# Patient Record
Sex: FEMALE | Race: WHITE
[De-identification: names, ages, dates, MRNs, and addresses within clinical notes are randomized per-mention and may not be internally consistent; named-entity substitution may affect disease eponyms.]

---

## 2019-01-23 ENCOUNTER — RX ONLY (RX ONLY)
Age: 59
End: 2019-01-23

## 2019-01-23 ENCOUNTER — DOCTOR'S OFFICE (OUTPATIENT)
Dept: URBAN - NONMETROPOLITAN AREA CLINIC 1 | Facility: CLINIC | Age: 59
Setting detail: OPHTHALMOLOGY
End: 2019-01-23
Payer: COMMERCIAL

## 2019-01-23 DIAGNOSIS — E03.4: ICD-10-CM

## 2019-01-23 DIAGNOSIS — H25.013: ICD-10-CM

## 2019-01-23 PROCEDURE — 99204 OFFICE O/P NEW MOD 45 MIN: CPT | Performed by: OPHTHALMOLOGY

## 2019-01-23 ASSESSMENT — REFRACTION_MANIFEST
OS_VA1: 20/25+1
OD_VA1: 20/25+1
OS_VA3: 20/
OS_VA1: 20/
OS_CYLINDER: -0.50
OS_VA3: 20/
OS_SPHERE: -0.25
OU_VA: 20/
OS_VA2: 20/25+1
OD_VA3: 20/
OU_VA: 20/
OS_ADD: +2.50
OD_SPHERE: -1.00
OD_VA1: 20/
OD_VA3: 20/
OD_CYLINDER: -0.25
OD_AXIS: 120
OS_AXIS: 120
OD_VA2: 20/25+1
OD_ADD: +2.50
OD_VA2: 20/
OS_VA2: 20/

## 2019-01-23 ASSESSMENT — REFRACTION_CURRENTRX
OD_SPHERE: -1.25
OD_CYLINDER: -0.25
OD_VPRISM_DIRECTION: BF
OS_OVR_VA: 20/
OS_VPRISM_DIRECTION: BF
OS_CYLINDER: -1.00
OD_AXIS: 005
OD_OVR_VA: 20/
OD_ADD: +2.75
OS_OVR_VA: 20/
OS_ADD: +2.75
OS_AXIS: 087
OS_SPHERE: -0.50
OS_OVR_VA: 20/

## 2019-01-23 ASSESSMENT — SPHEQUIV_DERIVED
OS_SPHEQUIV: -0.5
OS_SPHEQUIV: -1.625
OD_SPHEQUIV: -1.125
OD_SPHEQUIV: -1.125

## 2019-01-23 ASSESSMENT — REFRACTION_AUTOREFRACTION
OD_SPHERE: -1.00
OS_AXIS: 129
OD_AXIS: 130
OD_CYLINDER: -0.25
OS_SPHERE: -0.50
OS_CYLINDER: -2.25

## 2019-01-23 ASSESSMENT — CONFRONTATIONAL VISUAL FIELD TEST (CVF)
OD_FINDINGS: FULL
OS_FINDINGS: FULL

## 2019-01-23 ASSESSMENT — VISUAL ACUITY
OS_BCVA: 20/60
OD_BCVA: 20/50

## 2019-07-24 ENCOUNTER — DOCTOR'S OFFICE (OUTPATIENT)
Dept: URBAN - NONMETROPOLITAN AREA CLINIC 1 | Facility: CLINIC | Age: 59
Setting detail: OPHTHALMOLOGY
End: 2019-07-24
Payer: COMMERCIAL

## 2019-07-24 DIAGNOSIS — E03.4: ICD-10-CM

## 2019-07-24 DIAGNOSIS — I63.50: ICD-10-CM

## 2019-07-24 DIAGNOSIS — H25.013: ICD-10-CM

## 2019-07-24 PROCEDURE — 92014 COMPRE OPH EXAM EST PT 1/>: CPT | Performed by: OPHTHALMOLOGY

## 2019-07-24 PROCEDURE — 92083 EXTENDED VISUAL FIELD XM: CPT | Performed by: OPHTHALMOLOGY

## 2019-07-24 ASSESSMENT — REFRACTION_MANIFEST
OS_VA3: 20/
OS_VA2: 20/
OU_VA: 20/
OD_SPHERE: -1.00
OS_VA3: 20/
OS_VA1: 20/25+1
OS_VA2: 20/25+1
OD_VA1: 20/
OU_VA: 20/
OD_VA3: 20/
OD_VA1: 20/25+1
OD_AXIS: 120
OD_CYLINDER: -0.25
OS_VA1: 20/
OS_AXIS: 120
OD_VA2: 20/25+1
OS_SPHERE: -0.25
OD_VA2: 20/
OD_ADD: +2.50
OD_VA3: 20/
OS_CYLINDER: -0.50
OS_ADD: +2.50

## 2019-07-24 ASSESSMENT — SPHEQUIV_DERIVED
OD_SPHEQUIV: -1.125
OS_SPHEQUIV: -1
OD_SPHEQUIV: -1.625
OS_SPHEQUIV: -0.5

## 2019-07-24 ASSESSMENT — REFRACTION_CURRENTRX
OS_AXIS: 086
OS_VPRISM_DIRECTION: BF
OS_OVR_VA: 20/
OS_ADD: +2.50
OS_OVR_VA: 20/
OD_OVR_VA: 20/
OD_ADD: +2.50
OS_CYLINDER: -0.75
OD_VPRISM_DIRECTION: BF
OS_OVR_VA: 20/
OS_SPHERE: -0.25
OD_SPHERE: -1.00
OD_AXIS: 180
OD_OVR_VA: 20/
OD_CYLINDER: 0.00
OD_OVR_VA: 20/

## 2019-07-24 ASSESSMENT — CONFRONTATIONAL VISUAL FIELD TEST (CVF)
OD_FINDINGS: FULL
OS_FINDINGS: FULL

## 2019-07-24 ASSESSMENT — VISUAL ACUITY
OD_BCVA: 20/60+2
OS_BCVA: 20/60

## 2019-07-24 ASSESSMENT — KERATOMETRY
OD_AXISANGLE_DEGREES: 106
OD_K1POWER_DIOPTERS: 44.00
OD_K2POWER_DIOPTERS: 44.50
METHOD_AUTO_MANUAL: AUTO

## 2019-07-24 ASSESSMENT — REFRACTION_AUTOREFRACTION
OD_SPHERE: -1.25
OS_SPHERE: -1.00
OS_CYLINDER: 0.00
OD_CYLINDER: -0.75
OD_AXIS: 118

## 2019-07-24 ASSESSMENT — AXIALLENGTH_DERIVED
OD_AL: 23.7559
OD_AL: 23.955

## 2019-07-31 ENCOUNTER — OPTICAL OFFICE (OUTPATIENT)
Dept: URBAN - NONMETROPOLITAN AREA CLINIC 4 | Facility: CLINIC | Age: 59
Setting detail: OPHTHALMOLOGY
End: 2019-07-31
Payer: COMMERCIAL

## 2019-07-31 ENCOUNTER — DOCTOR'S OFFICE (OUTPATIENT)
Dept: URBAN - NONMETROPOLITAN AREA CLINIC 1 | Facility: CLINIC | Age: 59
Setting detail: OPHTHALMOLOGY
End: 2019-07-31
Payer: COMMERCIAL

## 2019-07-31 DIAGNOSIS — H52.4: ICD-10-CM

## 2019-07-31 DIAGNOSIS — H52.223: ICD-10-CM

## 2019-07-31 PROCEDURE — V2203 LENS SPHCYL BIFOCAL 4.00D/.1: HCPCS | Performed by: OPTOMETRIST

## 2019-07-31 PROCEDURE — 92015 DETERMINE REFRACTIVE STATE: CPT | Performed by: OPTOMETRIST

## 2019-07-31 PROCEDURE — V2020 VISION SVCS FRAMES PURCHASES: HCPCS | Performed by: OPTOMETRIST

## 2019-07-31 ASSESSMENT — VISUAL ACUITY
OS_BCVA: 20/80-1
OD_BCVA: 20/70

## 2019-07-31 ASSESSMENT — REFRACTION_CURRENTRX
OD_CYLINDER: 0.00
OS_OVR_VA: 20/
OS_VPRISM_DIRECTION: BF
OD_VPRISM_DIRECTION: BF
OD_OVR_VA: 20/
OS_OVR_VA: 20/
OD_OVR_VA: 20/
OS_ADD: +2.50
OS_CYLINDER: -0.75
OD_SPHERE: -1.00-
OD_ADD: +2.50
OS_OVR_VA: 20/
OS_SPHERE: -0.25
OS_AXIS: 087
OD_OVR_VA: 20/
OD_AXIS: 180

## 2019-07-31 ASSESSMENT — REFRACTION_MANIFEST
OD_VA1: 20/25-
OD_VA2: 20/
OD_CYLINDER: -0.75
OS_SPHERE: -1.00
OD_AXIS: 120
OD_ADD: +2.50
OS_CYLINDER: -0.50
OU_VA: 20/
OD_VA1: 20/
OD_SPHERE: -1.25
OU_VA: 20/20-2
OS_VA1: 20/
OD_VA3: 20/
OS_ADD: +2.50
OS_AXIS: 120
OD_VA3: 20/
OS_VA3: 20/
OS_VA2: 20/25+1
OS_VA3: 20/
OS_VA2: 20/
OD_VA2: 20/25+1
OS_VA1: 20/20-

## 2019-07-31 ASSESSMENT — REFRACTION_AUTOREFRACTION
OS_SPHERE: -1.00
OD_AXIS: 075
OS_AXIS: 152
OD_CYLINDER: -2.25
OS_CYLINDER: -0.25
OD_SPHERE: -1.00

## 2019-07-31 ASSESSMENT — SPHEQUIV_DERIVED
OS_SPHEQUIV: -1.25
OD_SPHEQUIV: -1.625
OD_SPHEQUIV: -2.125
OS_SPHEQUIV: -1.125

## 2019-07-31 ASSESSMENT — AXIALLENGTH_DERIVED
OD_AL: 23.955
OD_AL: 24.1576

## 2019-08-28 ENCOUNTER — DOCTOR'S OFFICE (OUTPATIENT)
Dept: URBAN - NONMETROPOLITAN AREA CLINIC 1 | Facility: CLINIC | Age: 59
Setting detail: OPHTHALMOLOGY
End: 2019-08-28
Payer: COMMERCIAL

## 2019-08-28 ENCOUNTER — RX ONLY (RX ONLY)
Age: 59
End: 2019-08-28

## 2019-08-28 DIAGNOSIS — H47.013: ICD-10-CM

## 2019-08-28 DIAGNOSIS — I63.50: ICD-10-CM

## 2019-08-28 DIAGNOSIS — E03.4: ICD-10-CM

## 2019-08-28 DIAGNOSIS — H25.013: ICD-10-CM

## 2019-08-28 PROCEDURE — 92133 CPTRZD OPH DX IMG PST SGM ON: CPT | Performed by: OPHTHALMOLOGY

## 2019-08-28 PROCEDURE — 92014 COMPRE OPH EXAM EST PT 1/>: CPT | Performed by: OPHTHALMOLOGY

## 2019-08-28 ASSESSMENT — REFRACTION_MANIFEST
OS_VA2: 20/
OD_AXIS: 120
OD_VA1: 20/25-
OS_AXIS: 120
OS_VA1: 20/20-
OS_CYLINDER: -0.50
OD_VA1: 20/
OS_VA3: 20/
OD_CYLINDER: -0.75
OU_VA: 20/
OD_VA2: 20/
OS_VA3: 20/
OD_VA3: 20/
OS_VA2: 20/25+1
OS_SPHERE: -1.00
OD_SPHERE: -1.25
OS_ADD: +2.50
OD_VA3: 20/
OD_VA2: 20/25+1
OD_ADD: +2.50
OU_VA: 20/20-2
OS_VA1: 20/

## 2019-08-28 ASSESSMENT — REFRACTION_CURRENTRX
OS_OVR_VA: 20/
OD_ADD: +2.75
OS_ADD: +3.00
OS_CYLINDER: -0.50
OS_VPRISM_DIRECTION: BF
OD_OVR_VA: 20/
OD_AXIS: 128
OS_SPHERE: -1.25
OD_VPRISM_DIRECTION: BF
OS_OVR_VA: 20/
OD_OVR_VA: 20/
OD_OVR_VA: 20/
OS_AXIS: 126
OS_OVR_VA: 20/
OD_CYLINDER: -0.75
OD_SPHERE: -1.50

## 2019-08-28 ASSESSMENT — VISUAL ACUITY
OD_BCVA: 20/20-1
OS_BCVA: 20/25

## 2019-08-28 ASSESSMENT — REFRACTION_AUTOREFRACTION
OS_AXIS: 152
OS_SPHERE: -1.00
OD_SPHERE: -1.00
OS_CYLINDER: -0.25
OD_AXIS: 075
OD_CYLINDER: -2.25

## 2019-08-28 ASSESSMENT — SPHEQUIV_DERIVED
OS_SPHEQUIV: -1.25
OS_SPHEQUIV: -1.125
OD_SPHEQUIV: -2.125
OD_SPHEQUIV: -1.625

## 2019-08-28 ASSESSMENT — CONFRONTATIONAL VISUAL FIELD TEST (CVF)
OD_FINDINGS: FULL
OS_FINDINGS: FULL

## 2020-03-18 ENCOUNTER — DOCTOR'S OFFICE (OUTPATIENT)
Dept: URBAN - NONMETROPOLITAN AREA CLINIC 1 | Facility: CLINIC | Age: 60
Setting detail: OPHTHALMOLOGY
End: 2020-03-18
Payer: COMMERCIAL

## 2020-03-18 ENCOUNTER — RX ONLY (RX ONLY)
Age: 60
End: 2020-03-18

## 2020-03-18 DIAGNOSIS — H47.013: ICD-10-CM

## 2020-03-18 DIAGNOSIS — H25.13: ICD-10-CM

## 2020-03-18 DIAGNOSIS — I63.50: ICD-10-CM

## 2020-03-18 DIAGNOSIS — H25.013: ICD-10-CM

## 2020-03-18 PROBLEM — H40.1111: Status: ACTIVE | Noted: 2020-03-18

## 2020-03-18 PROBLEM — H52.4 MYOPIA ASTIGMATISM NOS PRESBYOPIA ; BOTH EYES: Status: ACTIVE | Noted: 2019-01-23

## 2020-03-18 PROBLEM — E03.4: Status: ACTIVE | Noted: 2019-01-23

## 2020-03-18 PROCEDURE — 92083 EXTENDED VISUAL FIELD XM: CPT | Performed by: OPHTHALMOLOGY

## 2020-03-18 PROCEDURE — 92014 COMPRE OPH EXAM EST PT 1/>: CPT | Performed by: OPHTHALMOLOGY

## 2020-03-18 ASSESSMENT — KERATOMETRY
METHOD_AUTO_MANUAL: AUTO
OD_AXISANGLE_DEGREES: 106
OD_K2POWER_DIOPTERS: 44.50
OD_K1POWER_DIOPTERS: 44.00

## 2020-03-18 ASSESSMENT — REFRACTION_AUTOREFRACTION
OD_AXIS: 052
OD_CYLINDER: -0.25
OS_SPHERE: ERROR OS
OD_SPHERE: -1.25

## 2020-03-18 ASSESSMENT — REFRACTION_CURRENTRX
OD_SPHERE: -1.50
OS_ADD: +3.00
OD_OVR_VA: 20/
OD_ADD: +3.00
OS_OVR_VA: 20/
OS_AXIS: 126
OD_AXIS: 128
OD_CYLINDER: -0.75
OS_CYLINDER: -0.50
OS_SPHERE: -1.25
OS_VPRISM_DIRECTION: BF
OD_VPRISM_DIRECTION: BF

## 2020-03-18 ASSESSMENT — VISUAL ACUITY
OD_BCVA: 20/25-1
OS_BCVA: 20/40

## 2020-03-18 ASSESSMENT — REFRACTION_MANIFEST
OS_CYLINDER: -0.50
OS_VA1: 20/20-
OD_VA2: 20/25+1
OD_CYLINDER: -0.75
OD_VA1: 20/25-
OS_AXIS: 120
OS_VA2: 20/25+1
OD_SPHERE: -1.25
OD_ADD: +2.50
OS_ADD: +2.50
OS_SPHERE: -1.00
OD_AXIS: 120
OU_VA: 20/20-2

## 2020-03-18 ASSESSMENT — SPHEQUIV_DERIVED
OD_SPHEQUIV: -1.375
OD_SPHEQUIV: -1.625
OS_SPHEQUIV: -1.25

## 2020-03-18 ASSESSMENT — AXIALLENGTH_DERIVED
OD_AL: 23.955
OD_AL: 23.855

## 2020-03-18 ASSESSMENT — CONFRONTATIONAL VISUAL FIELD TEST (CVF)
OS_FINDINGS: FULL
OD_FINDINGS: FULL

## 2023-12-20 ENCOUNTER — DOCTOR'S OFFICE (OUTPATIENT)
Dept: URBAN - NONMETROPOLITAN AREA CLINIC 1 | Facility: CLINIC | Age: 63
Setting detail: OPHTHALMOLOGY
End: 2023-12-20
Payer: COMMERCIAL

## 2023-12-20 DIAGNOSIS — H40.1132: ICD-10-CM

## 2023-12-20 DIAGNOSIS — I63.50: ICD-10-CM

## 2023-12-20 DIAGNOSIS — H25.13: ICD-10-CM

## 2023-12-20 DIAGNOSIS — H25.013: ICD-10-CM

## 2023-12-20 PROCEDURE — 76514 ECHO EXAM OF EYE THICKNESS: CPT | Performed by: OPHTHALMOLOGY

## 2023-12-20 PROCEDURE — 92014 COMPRE OPH EXAM EST PT 1/>: CPT | Performed by: OPHTHALMOLOGY

## 2023-12-20 PROCEDURE — 92133 CPTRZD OPH DX IMG PST SGM ON: CPT | Performed by: OPHTHALMOLOGY

## 2023-12-20 ASSESSMENT — CONFRONTATIONAL VISUAL FIELD TEST (CVF)
OS_FINDINGS: FULL
OD_FINDINGS: FULL

## 2023-12-21 ASSESSMENT — REFRACTION_CURRENTRX
OS_ADD: +3.00
OS_CYLINDER: -0.50
OD_OVR_VA: 20/
OS_AXIS: 126
OD_CYLINDER: -0.75
OD_SPHERE: -1.50
OD_AXIS: 128
OS_SPHERE: -1.25
OD_VPRISM_DIRECTION: BF
OS_OVR_VA: 20/
OD_ADD: +3.00
OS_VPRISM_DIRECTION: BF

## 2023-12-21 ASSESSMENT — SPHEQUIV_DERIVED
OD_SPHEQUIV: -1.625
OS_SPHEQUIV: -1.25
OS_SPHEQUIV: -2.25
OD_SPHEQUIV: -1.375

## 2023-12-21 ASSESSMENT — REFRACTION_MANIFEST
OS_VA2: 20/25+1
OD_SPHERE: -1.25
OS_VA1: 20/20-
OD_VA2: 20/25+1
OU_VA: 20/20-2
OS_ADD: +2.50
OD_AXIS: 120
OD_CYLINDER: -0.75
OD_ADD: +2.50
OD_VA1: 20/25-
OS_AXIS: 120
OS_CYLINDER: -0.50
OS_SPHERE: -1.00

## 2023-12-21 ASSESSMENT — REFRACTION_AUTOREFRACTION
OS_CYLINDER: -1.00
OS_AXIS: 122
OD_SPHERE: -1.25
OD_CYLINDER: -0.25
OD_AXIS: 052
OS_SPHERE: -1.75

## 2024-03-08 ENCOUNTER — DOCTOR'S OFFICE (OUTPATIENT)
Dept: URBAN - NONMETROPOLITAN AREA CLINIC 1 | Facility: CLINIC | Age: 64
Setting detail: OPHTHALMOLOGY
End: 2024-03-08
Payer: MEDICARE

## 2024-03-08 ENCOUNTER — RX ONLY (RX ONLY)
Age: 64
End: 2024-03-08

## 2024-03-08 DIAGNOSIS — I63.50: ICD-10-CM

## 2024-03-08 DIAGNOSIS — H40.1132: ICD-10-CM

## 2024-03-08 DIAGNOSIS — H25.013: ICD-10-CM

## 2024-03-08 DIAGNOSIS — H25.13: ICD-10-CM

## 2024-03-08 PROCEDURE — 99213 OFFICE O/P EST LOW 20 MIN: CPT | Performed by: OPHTHALMOLOGY

## 2024-03-08 PROCEDURE — 92083 EXTENDED VISUAL FIELD XM: CPT | Performed by: OPHTHALMOLOGY

## 2024-03-14 ENCOUNTER — DOCTOR'S OFFICE (OUTPATIENT)
Dept: URBAN - NONMETROPOLITAN AREA CLINIC 1 | Facility: CLINIC | Age: 64
Setting detail: OPHTHALMOLOGY
End: 2024-03-14
Payer: COMMERCIAL

## 2024-03-14 DIAGNOSIS — H52.13: ICD-10-CM

## 2024-03-14 DIAGNOSIS — H52.4: ICD-10-CM

## 2024-03-14 DIAGNOSIS — H52.223: ICD-10-CM

## 2024-03-14 PROCEDURE — 92015 DETERMINE REFRACTIVE STATE: CPT

## 2024-03-14 PROCEDURE — S0621 ROUTINE OPHTHALMOLOGICAL EXA: HCPCS

## 2024-03-14 PROCEDURE — 92014 COMPRE OPH EXAM EST PT 1/>: CPT

## 2024-03-26 ENCOUNTER — TELEPHONE (OUTPATIENT)
Dept: ADMINISTRATIVE | Facility: OTHER | Age: 64
End: 2024-03-26

## 2024-03-26 ENCOUNTER — OFFICE VISIT (OUTPATIENT)
Dept: FAMILY MEDICINE CLINIC | Facility: CLINIC | Age: 64
End: 2024-03-26
Payer: COMMERCIAL

## 2024-03-26 VITALS
TEMPERATURE: 97.8 F | OXYGEN SATURATION: 96 % | HEIGHT: 66 IN | HEART RATE: 70 BPM | WEIGHT: 293 LBS | DIASTOLIC BLOOD PRESSURE: 80 MMHG | BODY MASS INDEX: 47.09 KG/M2 | SYSTOLIC BLOOD PRESSURE: 126 MMHG

## 2024-03-26 DIAGNOSIS — I10 PRIMARY HYPERTENSION: ICD-10-CM

## 2024-03-26 DIAGNOSIS — K21.9 GASTROESOPHAGEAL REFLUX DISEASE WITHOUT ESOPHAGITIS: ICD-10-CM

## 2024-03-26 DIAGNOSIS — K50.919 CROHN'S DISEASE WITH COMPLICATION, UNSPECIFIED GASTROINTESTINAL TRACT LOCATION (HCC): ICD-10-CM

## 2024-03-26 DIAGNOSIS — I69.30 HISTORY OF CVA WITH RESIDUAL DEFICIT: ICD-10-CM

## 2024-03-26 DIAGNOSIS — N18.32 STAGE 3B CHRONIC KIDNEY DISEASE (HCC): ICD-10-CM

## 2024-03-26 DIAGNOSIS — E78.2 MIXED HYPERLIPIDEMIA: Primary | ICD-10-CM

## 2024-03-26 DIAGNOSIS — R26.2 AMBULATORY DYSFUNCTION: ICD-10-CM

## 2024-03-26 DIAGNOSIS — N39.46 MIXED STRESS AND URGE URINARY INCONTINENCE: ICD-10-CM

## 2024-03-26 DIAGNOSIS — I25.10 CORONARY ARTERY DISEASE INVOLVING NATIVE CORONARY ARTERY OF NATIVE HEART WITHOUT ANGINA PECTORIS: ICD-10-CM

## 2024-03-26 DIAGNOSIS — E66.01 CLASS 3 SEVERE OBESITY DUE TO EXCESS CALORIES WITH SERIOUS COMORBIDITY AND BODY MASS INDEX (BMI) OF 50.0 TO 59.9 IN ADULT (HCC): ICD-10-CM

## 2024-03-26 DIAGNOSIS — Z59.9 FINANCIAL DIFFICULTIES: ICD-10-CM

## 2024-03-26 DIAGNOSIS — F01.B3 MODERATE VASCULAR DEMENTIA WITH MOOD DISTURBANCE (HCC): ICD-10-CM

## 2024-03-26 DIAGNOSIS — E03.9 ACQUIRED HYPOTHYROIDISM: ICD-10-CM

## 2024-03-26 PROBLEM — E78.5 HYPERLIPIDEMIA: Status: ACTIVE | Noted: 2018-11-07

## 2024-03-26 PROBLEM — F41.1 GAD (GENERALIZED ANXIETY DISORDER): Status: ACTIVE | Noted: 2019-05-23

## 2024-03-26 PROBLEM — I51.81 TAKOTSUBO CARDIOMYOPATHY: Status: ACTIVE | Noted: 2021-06-14

## 2024-03-26 PROBLEM — J30.2 SEASONAL ALLERGIES: Status: ACTIVE | Noted: 2019-05-23

## 2024-03-26 PROBLEM — G89.29 CHRONIC BACK PAIN: Status: ACTIVE | Noted: 2020-10-16

## 2024-03-26 PROBLEM — F33.41 RECURRENT MAJOR DEPRESSIVE DISORDER, IN PARTIAL REMISSION (HCC): Chronic | Status: ACTIVE | Noted: 2019-05-23

## 2024-03-26 PROBLEM — M54.9 CHRONIC BACK PAIN: Status: ACTIVE | Noted: 2020-10-16

## 2024-03-26 PROBLEM — F02.80 LATE ONSET ALZHEIMER'S DISEASE WITHOUT BEHAVIORAL DISTURBANCE (HCC): Status: ACTIVE | Noted: 2019-05-23

## 2024-03-26 PROBLEM — R94.31 PROLONGED Q-T INTERVAL ON ECG: Status: ACTIVE | Noted: 2021-06-14

## 2024-03-26 PROBLEM — K50.90 CROHN'S DISEASE (HCC): Status: ACTIVE | Noted: 2021-06-14

## 2024-03-26 PROBLEM — R00.1 SYMPTOMATIC BRADYCARDIA: Status: ACTIVE | Noted: 2021-06-14

## 2024-03-26 PROBLEM — R00.1 SYMPTOMATIC BRADYCARDIA: Status: RESOLVED | Noted: 2021-06-14 | Resolved: 2024-03-26

## 2024-03-26 PROBLEM — G30.1 LATE ONSET ALZHEIMER'S DISEASE WITHOUT BEHAVIORAL DISTURBANCE (HCC): Status: ACTIVE | Noted: 2019-05-23

## 2024-03-26 PROCEDURE — G2211 COMPLEX E/M VISIT ADD ON: HCPCS | Performed by: FAMILY MEDICINE

## 2024-03-26 PROCEDURE — 99205 OFFICE O/P NEW HI 60 MIN: CPT | Performed by: FAMILY MEDICINE

## 2024-03-26 RX ORDER — ATORVASTATIN CALCIUM 10 MG/1
10 TABLET, FILM COATED ORAL DAILY
COMMUNITY

## 2024-03-26 RX ORDER — MULTIVITAMIN/IRON/FOLIC ACID 18MG-0.4MG
TABLET ORAL
COMMUNITY

## 2024-03-26 RX ORDER — ACETAMINOPHEN 325 MG/1
650 TABLET ORAL EVERY 4 HOURS PRN
COMMUNITY

## 2024-03-26 RX ORDER — MAGNESIUM HYDROXIDE/ALUMINUM HYDROXICE/SIMETHICONE 120; 1200; 1200 MG/30ML; MG/30ML; MG/30ML
30 SUSPENSION ORAL EVERY 4 HOURS PRN
COMMUNITY

## 2024-03-26 RX ORDER — PANTOPRAZOLE SODIUM 20 MG/1
20 TABLET, DELAYED RELEASE ORAL DAILY
COMMUNITY

## 2024-03-26 RX ORDER — GUAIFENESIN 200 MG/10ML
200 LIQUID ORAL
COMMUNITY

## 2024-03-26 RX ORDER — LEVOTHYROXINE SODIUM 0.03 MG/1
25 TABLET ORAL DAILY
COMMUNITY
End: 2024-03-27 | Stop reason: SDUPTHER

## 2024-03-26 RX ORDER — FLUOXETINE HYDROCHLORIDE 40 MG/1
40 CAPSULE ORAL DAILY
COMMUNITY

## 2024-03-26 RX ORDER — ASPIRIN 81 MG/1
81 TABLET, CHEWABLE ORAL DAILY
COMMUNITY

## 2024-03-26 SDOH — ECONOMIC STABILITY - INCOME SECURITY: PROBLEM RELATED TO HOUSING AND ECONOMIC CIRCUMSTANCES, UNSPECIFIED: Z59.9

## 2024-03-26 NOTE — PROGRESS NOTES
Name: Ginna Landreos      : 1960      MRN: [id number]  Encounter Provider: Mallika Smiley DO  Encounter Date: 3/26/2024   Encounter department: [facility]    Assessment & Plan     1. Mixed hyperlipidemia  Assessment & Plan:  No labs since   Was previously on statin and Zetia, but not currently taking these medications  Will plan for labs at follow-up visit and likely restarting her medications given her history.      2. Ambulatory dysfunction  Assessment & Plan:  CVA in  with residual left sided weakness. Patient currently ambulating with a walker.  Her goal would be to return to walking with a cane and overall improve her ambulatory status.  She never completed any aggressive therapy following her stroke and is interested in this at this time.  I am going to refer to outpatient PT, pending evaluation, patient may benefit from in-home therapy.  Will continue to monitor    Orders:  -     Ambulatory Referral to Physical Therapy; Future    3. History of CVA with residual deficit  Assessment & Plan:  ?  Does not appear patient is on any medications for this at this time.  She does appear to have been on aspirin and Lipitor in the past.  Per pharmacy she has not filled any of these prescriptions within the last year.  Will obtain labs at her follow-up visit with me and plan on restarting her on her medications.     Orders:  -     Ambulatory Referral to Physical Therapy; Future    4. Mixed stress and urge urinary incontinence  Assessment & Plan:  Recommend PT and regular kegal exercises. Continue to monitor.     Orders:  -     Ambulatory Referral to Physical Therapy; Future    5. Crohn's disease with complication, unspecified gastrointestinal tract location (HCC)  Assessment & Plan:  Patient reports a history of Crohn's disease, not currently on any medications.  She denies any acute symptoms at this time.  Will continue to monitor      6. Stage 3b chronic kidney disease  (HCC)  Assessment & Plan:  Lab Results   Component Value Date    EGFR 88 12/13/2023    EGFR 96 11/15/2023    EGFR 72 09/08/2023    CREATININE 0.75 12/13/2023    CREATININE 0.69 11/15/2023    CREATININE 0.89 09/08/2023     Recently labs with stage 2 CKD, will update labs at follow up and continue to monitor.       7. 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)  Assessment & Plan:  Was previously documented as alzheimer's dementia, however given her age and history of CVA, MI, I believe this is vascular dementia. There is not testing done to confirm dementia type. Consider MRI imaging and MMSE testing at follow up. Patient was on aricept in the past but does not currently take this medication      8. Acquired hypothyroidism  Assessment & Plan:  Currently uncontrolled at this time had TSH greater than 100 last week from labs completed by her prior PCP  Patient currently admits to taking levothyroxine 200 mcg daily and has a refill that she is supposed to be picking up.  Per pharmacy, patient has not filled this prescription since September 2023. I will send a refill, and follow up with patient in 2-4 weeks    Orders:  -     levothyroxine 200 mcg tablet; Take 1 tablet (200 mcg total) by mouth daily    9. Gastroesophageal reflux disease without esophagitis  Assessment & Plan:  Was previously on Protonix, do not believe patient is taking this based on pharmacy provided information.  Will continue to monitor symptoms and consider resuming medication at follow-up if needed      10. Coronary artery disease involving native coronary artery of native heart without angina pectoris  Assessment & Plan:  History of MI per patient, without stent placement? (History unclear)  Cardiology saw patient in hospital 6/2021 - patient with NSTEMI/Takotsubo cardiomyopathy thought to be 2/2 uncontrolled hypothyroidism. Echo at the time EF 30-35%. Patient discharged when thyroid improved and was to f/u with cardiology and have  follow up echo but never did.   Does not currently follow with cardiology. Patient not sure of medications. Per pharmacy patient uses, she is not currently taking any of the previously prescribed medications: asa, atorvastatin  Will attempt to obtain records from prior PCP  Plan for CBC, lipid panel, A1C, CMP, echo at follow up and restart patient on statin, asa. Would additionally recommend BB, ACEi      11. Primary hypertension  Assessment & Plan:  I do not believe patient is currently on any medications for this. Current Blood Pressure: 126/80  Possibly low in setting of hypothyroidism. Will need to continue to monitor as we treat her thyroid disease.       12. Class 3 severe obesity due to excess calories with serious comorbidity and body mass index (BMI) of 50.0 to 59.9 in adult (HCC)    13. Financial difficulties  -     Ambulatory Referral to Social Work Care Management Program; Future        Depression Screening and Follow-up Plan: Patient's depression screening was positive with a PHQ-9 score of 12. Depression likely due to other medical condition. Will treat underlying condition. Suspect this is in the setting of vascular dementia. Patient also with document history of depression and anxiety. We briefly discussed this. It does not appear patient is taking the prozac as pharmacy has not filled this for her in some time. We will confirm medical records and follow up closely in 2-4 weeks to determine medication plan for her.     Urinary Incontinence Plan of Care: counseling topics discussed: practice Kegel (pelvic floor strengthening) exercises and use restroom every 2 hours. Referral was placed for Physical therapy.     Tobacco Cessation Counseling: Tobacco cessation counseling was provided. The patient is sincerely urged to quit consumption of tobacco. She is not ready to quit tobacco.         I have spent a total time of 65 minutes on 03/26/2024 in caring for this patient including Patient and family  education, Counseling / Coordination of care, Documenting in the medical record, Reviewing / ordering tests, medicine, procedures  , and Obtaining or reviewing history  .      Subjective      HPI  Patient presents to [facility]. She reports she used to go to Dr. Peace for primary care. She lives in [place].     Patient unable to provide me with reliable PMH. [facility] and other available sources to gather data on this patient. We have not yet received any medical records from her prior PCP.     PMH: Htn, HLD, CAD, GERD, hypothyroidism, CKD3, history of CVA, History of MI, anxiety, and depression, Crohn's Disease and not currently following with anyone for this,   SurgHx: Back surgery, pilonidal cyst surgery, Cholecystectomy, dental surgery   Allergies: apple, strawberry, ciprofloxacin, penicillins   Medications: Patient forgot her med list and is unsure what she is taking. What is currently listed was pulled from other medical records.   SocialHx:   Tobacco: current smoker - 1ppd for >40 years    Alcohol: <3-4 beers per week on average    Relationship: single   Career: , [employer], [employer], [employer] (now retired and on medicare)     She had labs for thyroid last week which showed significantly elevated TSH at 137.14 and T4 at 0.31  She is currently taking her thyroid pill, stating she is taking 200mcg daily, they just told her to increase it but she never got this from the drug store.     She is still feeling very fatigued and tired. Denies heat or cold intolerance. She denies constipation. She admits to depression.     She uses [place] [facility] for her medications.     Overall patient is a poor historian. She has a history of a stroke and documented history of Alzheimer dementia, though I suspect component of vascular dementia given her history of stroke and MI. She is unable to confirm any medical history of medications for me today.     Staff called  [facility] and only medications the patient has filled there are levothyroxine 200mcg daily, last filled in September, and Latanoprost eye Drops. Patient denied other pharmacy use.     Since her stroke the patient reports she has needed a walker for ambulation. She would like to be more mobile and get back to using just a cane for ambulation but she has not worked with physical therapy in some time. She states she was told someone would be coming to her home for this or calling to schedule but she never heard from them.     He prior PCP's office has not provided us with any medical records.     [facility] [place] [facility] [place] the most up to date medication list I could find from [facility]:   [facility]  Atorvastatin 80mg daily  Aspirin 81mg daily  Zyrtec 10mg daily  Omeprazole 20mg daily  Prozac 40mg daily  Buspar 10mg BID  Aricept 10mg daily  Plavix 75mg daily  Zetia 10mg daily  Levothyroxine 200mcg daily  Toprol XL 25mg daily Losartan 25mg daily    PHQ-2/9 Depression Screening    Little interest or pleasure in doing things: 2 - more than half the days  Feeling down, depressed, or hopeless: 2 - more than half the days  Trouble falling or staying asleep, or sleeping too much: 1 - several days  Feeling tired or having little energy: 2 - more than half the days  Poor appetite or overeatin - more than half the days  Feeling bad about yourself - or that you are a failure or have let yourself or your family down: 0 - not at all  Trouble concentrating on things, such as reading the newspaper or watching television: 1 - several days  Moving or speaking so slowly that other people could have noticed. Or the opposite - being so fidgety or restless that you have been moving around a lot more than usual: 2 - more than half the days  Thoughts that you would be better off dead, or of hurting yourself in some way: 0 - not at all  PHQ-9 Score: 12  PHQ-9 Interpretation: Moderate  "depression         Review of Systems   Constitutional:  Positive for fatigue. Negative for chills and fever.   HENT:  Negative for congestion and sore throat.    Eyes:  Negative for visual disturbance.   Respiratory:  Negative for cough and shortness of breath.    Gastrointestinal:  Negative for constipation and diarrhea.   Endocrine: Negative for cold intolerance, heat intolerance, polydipsia and polyuria.   Genitourinary:  Negative for difficulty urinating, frequency and hematuria.   Skin:  Negative for rash.   Psychiatric/Behavioral:  Positive for dysphoric mood.        Current Outpatient Medications on File Prior to Visit   Medication Sig    acetaminophen (TYLENOL) 325 mg tablet Take 650 mg by mouth every 4 (four) hours as needed    aluminum-magnesium hydroxide-simethicone (MAALOX) 9456-1209-700 mg/30 mL suspension Take 30 mL by mouth every 4 (four) hours as needed for indigestion or heartburn    aspirin 81 mg chewable tablet Chew 81 mg daily    atorvastatin (LIPITOR) 10 mg tablet Take 10 mg by mouth daily    FLUoxetine (PROzac) 40 MG capsule Take 40 mg by mouth daily    guaiFENesin (ROBITUSSIN) 100 MG/5ML oral liquid Take 200 mg by mouth daily at bedtime    Multiple Vitamins-Minerals (CENTROVITE) TABS Take by mouth    pantoprazole (PROTONIX) 20 mg tablet Take 20 mg by mouth daily    [DISCONTINUED] levothyroxine 25 mcg tablet Take 25 mcg by mouth daily       Objective     /80 (BP Location: Left arm, Patient Position: Sitting)   Pulse 70   Temp 97.8 °F (36.6 °C) (Tympanic)   Ht 5' 6\" (1.676 m)   Wt (!) 156 kg (343 lb 9.6 oz)   SpO2 96%   BMI 55.46 kg/m²     Physical Exam  Vitals reviewed.   Constitutional:       General: She is not in acute distress.     Appearance: She is obese. She is not ill-appearing.      Comments: Ambulates with walker   HENT:      Head: Normocephalic and atraumatic.      Right Ear: External ear normal.      Left Ear: External ear normal.   Eyes:      Extraocular Movements: " Extraocular movements intact.      Conjunctiva/sclera: Conjunctivae normal.   Cardiovascular:      Rate and Rhythm: Normal rate.      Heart sounds: Normal heart sounds.   Pulmonary:      Effort: Pulmonary effort is normal.      Breath sounds: Normal breath sounds.   Abdominal:      General: Abdomen is flat. Bowel sounds are normal. There is no distension.      Palpations: Abdomen is soft.      Tenderness: There is no abdominal tenderness.   Neurological:      Mental Status: She is alert.   Psychiatric:         Attention and Perception: Attention normal.         Mood and Affect: Affect normal. Mood is depressed.         Speech: Speech normal.         Behavior: Behavior is cooperative.         Thought Content: Thought content normal.         Cognition and Memory: Cognition is impaired. Memory is impaired.       Mallika Smiley, DO

## 2024-03-26 NOTE — TELEPHONE ENCOUNTER
----- Message from Alyson Hopper sent at 3/26/2024  7:05 AM EDT -----  03/26/24 7:05 AM    Hello, our patient Ginna Landeros has had Hepatitis C completed/performed. Please assist in updating the patient chart by pulling the Care Everywhere (CE) document. The date of service is 10/16/20.     Thank you,  Alyson GARCIA

## 2024-03-26 NOTE — TELEPHONE ENCOUNTER
Upon review of the In Basket request we were able to locate, review, and update the patient chart as requested for Hepatitis C .    Any additional questions or concerns should be emailed to the Practice Liaisons via the appropriate education email address, please do not reply via In Basket.    Thank you  Kate Lee MA

## 2024-03-26 NOTE — PROGRESS NOTES
When rooming patient I tried to get as much information as I could.  She is not very good at remembering things, due to dementia.  I asked her if the person with her knew any of her medical information and she said no.  She forgot her medication list. She was able to list some of her medications but not the doses, and also some allergies.  I put  as much information in the chart as I was able to with the information I was given.

## 2024-03-27 ENCOUNTER — PATIENT OUTREACH (OUTPATIENT)
Dept: FAMILY MEDICINE CLINIC | Facility: CLINIC | Age: 64
End: 2024-03-27

## 2024-03-27 PROBLEM — F01.B3 MODERATE VASCULAR DEMENTIA WITH MOOD DISTURBANCE (HCC): Status: ACTIVE | Noted: 2019-05-23

## 2024-03-27 PROBLEM — E66.01 CLASS 3 SEVERE OBESITY DUE TO EXCESS CALORIES WITH SERIOUS COMORBIDITY AND BODY MASS INDEX (BMI) OF 50.0 TO 59.9 IN ADULT (HCC): Status: ACTIVE | Noted: 2024-03-27

## 2024-03-27 PROBLEM — F33.41 RECURRENT MAJOR DEPRESSIVE DISORDER, IN PARTIAL REMISSION (HCC): Chronic | Status: RESOLVED | Noted: 2019-05-23 | Resolved: 2024-03-27

## 2024-03-27 PROBLEM — N39.46 MIXED STRESS AND URGE URINARY INCONTINENCE: Status: ACTIVE | Noted: 2024-03-27

## 2024-03-27 PROBLEM — E66.813 CLASS 3 SEVERE OBESITY DUE TO EXCESS CALORIES WITH SERIOUS COMORBIDITY AND BODY MASS INDEX (BMI) OF 50.0 TO 59.9 IN ADULT (HCC): Status: ACTIVE | Noted: 2024-03-27

## 2024-03-27 RX ORDER — LEVOTHYROXINE SODIUM 0.2 MG/1
200 TABLET ORAL DAILY
Qty: 30 TABLET | Refills: 0 | Status: SHIPPED | OUTPATIENT
Start: 2024-03-27

## 2024-03-27 NOTE — ASSESSMENT & PLAN NOTE
No labs since 2021  Was previously on statin and Zetia, but not currently taking these medications  Will plan for labs at follow-up visit and likely restarting her medications given her history.

## 2024-03-27 NOTE — ASSESSMENT & PLAN NOTE
Currently uncontrolled at this time had TSH greater than 100 last week from labs completed by her prior PCP  Patient currently admits to taking levothyroxine 200 mcg daily and has a refill that she is supposed to be picking up.  Per pharmacy, patient has not filled this prescription since September 2023. I will send a refill, and follow up with patient in 2-4 weeks

## 2024-03-27 NOTE — ASSESSMENT & PLAN NOTE
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 Patient currently ambulating with a walker.  Her goal would be to return to walking with a cane and overall improve her ambulatory status.  She never completed any aggressive therapy following her stroke and is interested in this at this time.  I am going to refer to outpatient PT, pending evaluation, patient may benefit from in-home therapy.  Will continue to monitor

## 2024-03-27 NOTE — ASSESSMENT & PLAN NOTE
2015?  Does not appear patient is on any medications for this at this time.  She does appear to have been on aspirin and Lipitor in the past.  Per pharmacy she has not filled any of these prescriptions within the last year.  Will obtain labs at her follow-up visit with me and plan on restarting her on her medications.

## 2024-03-27 NOTE — ASSESSMENT & PLAN NOTE
Was previously on Protonix, do not believe patient is taking this based on pharmacy provided information.  Will continue to monitor symptoms and consider resuming medication at follow-up if needed

## 2024-03-27 NOTE — ASSESSMENT & PLAN NOTE
History of MI per patient, without stent placement? (History unclear)  Cardiology saw patient in hospital 6/2021 - patient with NSTEMI/Takotsubo cardiomyopathy thought to be 2/2 uncontrolled hypothyroidism. Echo at the time EF 30-35%. Patient discharged when thyroid improved and was to f/u with cardiology and have follow up echo but never did.   Does not currently follow with cardiology. Patient not sure of medications. Per pharmacy patient uses, she is not currently taking any of the previously prescribed medications: asa, atorvastatin  Will attempt to obtain records from prior PCP  Plan for CBC, lipid panel, A1C, CMP, echo at follow up and restart patient on statin, asa. Would additionally recommend BB, ACEi

## 2024-03-27 NOTE — ASSESSMENT & PLAN NOTE
I do not believe patient is currently on any medications for this. Current Blood Pressure: 126/80  Possibly low in setting of hypothyroidism. Will need to continue to monitor as we treat her thyroid disease.

## 2024-03-27 NOTE — ASSESSMENT & PLAN NOTE
Lab Results   Component Value Date    EGFR 88 12/13/2023    EGFR 96 11/15/2023    EGFR 72 09/08/2023    CREATININE 0.75 12/13/2023    CREATININE 0.69 11/15/2023    CREATININE 0.89 09/08/2023     Recently labs with stage 2 CKD, will update labs at follow up and continue to monitor.

## 2024-03-27 NOTE — ASSESSMENT & PLAN NOTE
Was previously documented as alzheimer's dementia, however given her age and history of CVA, MI, I believe this is vascular dementia. There is not testing done to confirm dementia type. Consider MRI imaging and MMSE testing at follow up. Patient was on aricept in the past but does not currently take this medication

## 2024-03-27 NOTE — ASSESSMENT & PLAN NOTE
Patient reports a history of Crohn's disease, not currently on any medications.  She denies any acute symptoms at this time.  Will continue to monitor

## 2024-03-27 NOTE — PROGRESS NOTES
OP CM reviewed chart and called to pt in regards to SDOH referral.  Pt did not answer so left a message.  Pt does not have MyChart or email so will try calling again.

## 2024-03-28 ENCOUNTER — PATIENT OUTREACH (OUTPATIENT)
Dept: FAMILY MEDICINE CLINIC | Facility: CLINIC | Age: 64
End: 2024-03-28

## 2024-03-28 NOTE — PROGRESS NOTES
OP CM called to pts home phone number and this is a nursing facility.  Called to pts cell number and pt did not answer again so left message.  Also called to pts son David Engle and left message.

## 2024-04-12 ENCOUNTER — DOCTOR'S OFFICE (OUTPATIENT)
Dept: URBAN - NONMETROPOLITAN AREA CLINIC 1 | Facility: CLINIC | Age: 64
Setting detail: OPHTHALMOLOGY
End: 2024-04-12
Payer: MEDICARE

## 2024-04-12 DIAGNOSIS — H40.1132: ICD-10-CM

## 2024-04-12 DIAGNOSIS — H25.13: ICD-10-CM

## 2024-04-12 DIAGNOSIS — H25.013: ICD-10-CM

## 2024-04-12 PROCEDURE — 92012 INTRM OPH EXAM EST PATIENT: CPT | Performed by: OPHTHALMOLOGY

## 2024-04-16 ENCOUNTER — EVALUATION (OUTPATIENT)
Dept: PHYSICAL THERAPY | Facility: CLINIC | Age: 64
End: 2024-04-16
Payer: COMMERCIAL

## 2024-04-16 DIAGNOSIS — I69.30 HISTORY OF CVA WITH RESIDUAL DEFICIT: ICD-10-CM

## 2024-04-16 DIAGNOSIS — N39.46 MIXED STRESS AND URGE URINARY INCONTINENCE: ICD-10-CM

## 2024-04-16 DIAGNOSIS — R26.2 AMBULATORY DYSFUNCTION: ICD-10-CM

## 2024-04-16 PROCEDURE — 97110 THERAPEUTIC EXERCISES: CPT | Performed by: PHYSICAL THERAPIST

## 2024-04-16 PROCEDURE — 97530 THERAPEUTIC ACTIVITIES: CPT | Performed by: PHYSICAL THERAPIST

## 2024-04-16 NOTE — PROGRESS NOTES
PT Evaluation     Today's date: 2024  Patient name: Ginna Landeros  : 1960  MRN: [id number]  Referring provider: Mallika Smiley DO  Dx:   Encounter Diagnosis     ICD-10-CM    1. Ambulatory dysfunction  R26.2 Ambulatory Referral to Physical Therapy      2. History of CVA with residual deficit  I69.30 Ambulatory Referral to Physical Therapy      3. Mixed stress and urge urinary incontinence  N39.46 Ambulatory Referral to Physical Therapy                     Assessment  Assessment details: Patient is a 64 y.o. female who presents to PT with a PMH significant for heart attack and stroke. She states difficulty with all transfers as well as pain symptoms. Patient is able to ambulate with four wheeled walker with moderate dysfunction noted. Patient was instructed with a HEP this date for muscular strengthening of the BLE.   Impairments: abnormal gait, abnormal or restricted ROM, impaired balance, impaired physical strength, lacks appropriate home exercise program and pain with function    Symptom irritability: highUnderstanding of Dx/Px/POC: fair   Prognosis: fair    Goals  6 weeks -STG  Patient independent with initial HEP for strengthening of the BLE.  Patient able to ambulate with over level surfaces with appropriate assistive device for 200 feet without LOB.  Patient able to maintain tandem stance x 20 seconds with UE assist.  Patient able to demonstrate increased BLE strength by 1/2 MMT.    8 weeks -LTG  Patient independent with final HEP.  Patient able to ambulate over uneven surfaces without LOB using appropriate assistive device.  Patient to have increased strength to 4/5.  Patient able to demonstrate fair dynamic balance.       Plan  Plan details: Patient's evaluation is completed. Patient was instructed with a HEP this date. Patient has scheduled further PT sessions for treatment.    Patient would benefit from: PT eval and skilled physical therapy  Planned modality interventions: thermotherapy:  hydrocollator packs and cryotherapy  Planned therapy interventions: manual therapy, neuromuscular re-education, therapeutic activities, therapeutic exercise, gait training and home exercise program  Frequency: 2x week  Duration in weeks: 8  Plan of Care beginning date: 2024  Plan of Care expiration date: 2024  Treatment plan discussed with: patient        Subjective Evaluation    History of Present Illness  Mechanism of injury: Patient presents to PT with a BLE weakness and difficulty walking. Patient notes that she has a fistula and issues with UI however prefers to not address this issue at this time. She notes that she wants to be able to walk without the RW. She notes the LLE feels likes it wants to buckle and go out from under her. She notes she did live in a nursing home for about 5 years and used a brace on the left knee but she no longer has a brace for the knee. She notes that she can not feel her legs. She had a fall about 2 years ago and was unable to stand up from that position. Reports stroke 10 years ago.  PMH- 5 heart attacks  Patient Goals  Patient goals for therapy: improved balance  Patient goal: able to walk better  Pain  Current pain ratin  Location: knees  Quality: dull ache    Social Support  Lives in: apartment  Lives with: alone    Treatments  Previous treatment: physical therapy        Objective     Strength/Myotome Testing     Left Shoulder     Planes of Motion   Flexion: 3   Abduction: 3     Right Shoulder     Planes of Motion   Flexion: 3   Abduction: 3     Left Elbow   Flexion: 3  Extension: 3    Right Elbow   Flexion: 3  Extension: 3    Left Wrist/Hand   Wrist extension: 3  Wrist flexion: 3    Right Wrist/Hand   Wrist extension: 3  Wrist flexion: 3    Left Hip   Planes of Motion   Flexion: 3-  Extension: 3-  Abduction: 3    Right Hip   Planes of Motion   Flexion: 3-  Extension: 3-  Abduction: 3    Left Knee   Flexion: 3-  Extension: 3-    Right Knee   Flexion:  3-  Extension: 3-    Left Ankle/Foot   Dorsiflexion: 3-  Plantar flexion: 3-    Right Ankle/Foot   Dorsiflexion: 3-  Plantar flexion: 3-    Ambulation     Comments   Patient is able to ambulate with four wheeled walker with a short step length and poor weight shifting. Patient is able to complete sit to stand transfer with use of the UE for assist however declined completing the five times sit to stand test due to pain in her knee with transfers.              Precautions: history of 5 Mis/ stroke 10 years ago     Daily Treatment Diary:      Initial Evaluation Date: 04/16/24  Compliance 4/16                     Visit Number 1                    Re-Eval  IE                 MC   Foto Captured Y                           4/16                     Manual                                                                                        Ther-Ex                      Ankle pump x10                     LAQ x10                     Seated march x10                     Ball squeeze x10                     Seated clamshell x10                                                                                                                                   Neuro Re-Ed                      Side step                      march             tandem             Tight rope walk                          Ther-Act              gait training 50ft x2 cues for step length                     STS X2 with UE assist            stairs              Modalities                                                                                    Access Code: [email or web address]  URL: [email or web address]  Date: 04/16/2024  Prepared by: Amalia Ayoub    Exercises  - Seated Toe and Heel taps  - 1 x daily - 7 x weekly - 1 sets - 10 reps  - Seated Heel Slide  - 1 x daily - 7 x weekly - 1 sets - 10 reps  - Supine Hip Adduction Isometric with Ball  - 1 x daily - 7 x weekly - 1 sets - 10 reps  - Seated March  - 1 x daily - 7 x weekly - 1 sets - 10  reps

## 2024-04-18 PROCEDURE — 97161 PT EVAL LOW COMPLEX 20 MIN: CPT | Performed by: PHYSICAL THERAPIST

## 2024-04-23 ENCOUNTER — TELEPHONE (OUTPATIENT)
Dept: FAMILY MEDICINE CLINIC | Facility: CLINIC | Age: 64
End: 2024-04-23

## 2024-04-23 ENCOUNTER — OFFICE VISIT (OUTPATIENT)
Dept: FAMILY MEDICINE CLINIC | Facility: CLINIC | Age: 64
End: 2024-04-23
Payer: COMMERCIAL

## 2024-04-23 VITALS
DIASTOLIC BLOOD PRESSURE: 75 MMHG | HEART RATE: 74 BPM | SYSTOLIC BLOOD PRESSURE: 135 MMHG | HEIGHT: 66 IN | OXYGEN SATURATION: 100 % | BODY MASS INDEX: 47.09 KG/M2 | WEIGHT: 293 LBS

## 2024-04-23 DIAGNOSIS — Z12.11 SCREENING FOR COLON CANCER: ICD-10-CM

## 2024-04-23 DIAGNOSIS — I69.30 HISTORY OF CVA WITH RESIDUAL DEFICIT: ICD-10-CM

## 2024-04-23 DIAGNOSIS — Z59.9 FINANCIAL DIFFICULTIES: ICD-10-CM

## 2024-04-23 DIAGNOSIS — E78.2 MIXED HYPERLIPIDEMIA: ICD-10-CM

## 2024-04-23 DIAGNOSIS — F01.B3 MODERATE VASCULAR DEMENTIA WITH MOOD DISTURBANCE (HCC): ICD-10-CM

## 2024-04-23 DIAGNOSIS — K21.9 GASTROESOPHAGEAL REFLUX DISEASE WITHOUT ESOPHAGITIS: ICD-10-CM

## 2024-04-23 DIAGNOSIS — Z13.6 SCREENING FOR AAA (ABDOMINAL AORTIC ANEURYSM): ICD-10-CM

## 2024-04-23 DIAGNOSIS — I10 PRIMARY HYPERTENSION: ICD-10-CM

## 2024-04-23 DIAGNOSIS — N39.46 MIXED STRESS AND URGE URINARY INCONTINENCE: ICD-10-CM

## 2024-04-23 DIAGNOSIS — K50.919 CROHN'S DISEASE WITH COMPLICATION, UNSPECIFIED GASTROINTESTINAL TRACT LOCATION (HCC): ICD-10-CM

## 2024-04-23 DIAGNOSIS — F41.1 GAD (GENERALIZED ANXIETY DISORDER): ICD-10-CM

## 2024-04-23 DIAGNOSIS — F17.210 SMOKING GREATER THAN 30 PACK YEARS: ICD-10-CM

## 2024-04-23 DIAGNOSIS — Z78.0 POSTMENOPAUSAL: ICD-10-CM

## 2024-04-23 DIAGNOSIS — Z00.00 MEDICARE ANNUAL WELLNESS VISIT, SUBSEQUENT: Primary | ICD-10-CM

## 2024-04-23 DIAGNOSIS — E03.9 ACQUIRED HYPOTHYROIDISM: ICD-10-CM

## 2024-04-23 DIAGNOSIS — J30.9 CHRONIC ALLERGIC RHINITIS: ICD-10-CM

## 2024-04-23 DIAGNOSIS — R26.2 AMBULATORY DYSFUNCTION: ICD-10-CM

## 2024-04-23 DIAGNOSIS — N18.32 STAGE 3B CHRONIC KIDNEY DISEASE (HCC): ICD-10-CM

## 2024-04-23 DIAGNOSIS — Z12.31 ENCOUNTER FOR SCREENING MAMMOGRAM FOR BREAST CANCER: ICD-10-CM

## 2024-04-23 DIAGNOSIS — I25.10 CORONARY ARTERY DISEASE INVOLVING NATIVE CORONARY ARTERY OF NATIVE HEART WITHOUT ANGINA PECTORIS: ICD-10-CM

## 2024-04-23 PROCEDURE — G0439 PPPS, SUBSEQ VISIT: HCPCS | Performed by: FAMILY MEDICINE

## 2024-04-23 PROCEDURE — 99214 OFFICE O/P EST MOD 30 MIN: CPT | Performed by: FAMILY MEDICINE

## 2024-04-23 RX ORDER — FLUOXETINE HYDROCHLORIDE 40 MG/1
40 CAPSULE ORAL DAILY
Qty: 90 CAPSULE | Refills: 0 | Status: SHIPPED | OUTPATIENT
Start: 2024-04-23

## 2024-04-23 RX ORDER — EZETIMIBE 10 MG/1
10 TABLET ORAL DAILY
Qty: 90 TABLET | Refills: 0 | Status: SHIPPED | OUTPATIENT
Start: 2024-04-23

## 2024-04-23 RX ORDER — CETIRIZINE HYDROCHLORIDE 10 MG/1
10 TABLET, CHEWABLE ORAL DAILY
COMMUNITY
End: 2024-04-23 | Stop reason: SDUPTHER

## 2024-04-23 RX ORDER — OMEPRAZOLE 20 MG/1
20 CAPSULE, DELAYED RELEASE ORAL DAILY
COMMUNITY
End: 2024-04-23 | Stop reason: SDUPTHER

## 2024-04-23 RX ORDER — CETIRIZINE HYDROCHLORIDE 10 MG/1
10 TABLET, CHEWABLE ORAL DAILY
Qty: 90 TABLET | Refills: 0 | Status: SHIPPED | OUTPATIENT
Start: 2024-04-23

## 2024-04-23 RX ORDER — DONEPEZIL HYDROCHLORIDE 10 MG/1
10 TABLET, FILM COATED ORAL
Qty: 90 TABLET | Refills: 0 | Status: SHIPPED | OUTPATIENT
Start: 2024-04-23

## 2024-04-23 RX ORDER — ATORVASTATIN CALCIUM 80 MG/1
80 TABLET, FILM COATED ORAL DAILY
COMMUNITY
End: 2024-04-23 | Stop reason: SDUPTHER

## 2024-04-23 RX ORDER — LOSARTAN POTASSIUM 25 MG/1
25 TABLET ORAL DAILY
Qty: 90 TABLET | Refills: 0 | Status: SHIPPED | OUTPATIENT
Start: 2024-04-23

## 2024-04-23 RX ORDER — ASPIRIN 81 MG/1
81 TABLET, CHEWABLE ORAL DAILY
Qty: 90 TABLET | Refills: 0 | Status: SHIPPED | OUTPATIENT
Start: 2024-04-23

## 2024-04-23 RX ORDER — OMEPRAZOLE 20 MG/1
20 CAPSULE, DELAYED RELEASE ORAL DAILY
Qty: 90 CAPSULE | Refills: 0 | Status: SHIPPED | OUTPATIENT
Start: 2024-04-23

## 2024-04-23 RX ORDER — ATORVASTATIN CALCIUM 80 MG/1
80 TABLET, FILM COATED ORAL DAILY
Qty: 90 TABLET | Refills: 0 | Status: SHIPPED | OUTPATIENT
Start: 2024-04-23

## 2024-04-23 RX ORDER — CLOPIDOGREL BISULFATE 75 MG/1
75 TABLET ORAL DAILY
Qty: 90 TABLET | Refills: 0 | Status: SHIPPED | OUTPATIENT
Start: 2024-04-23

## 2024-04-23 RX ORDER — GUAIFENESIN 600 MG/1
1200 TABLET, EXTENDED RELEASE ORAL EVERY 12 HOURS SCHEDULED
Qty: 40 TABLET | Refills: 0 | Status: SHIPPED | OUTPATIENT
Start: 2024-04-23 | End: 2024-05-03

## 2024-04-23 RX ORDER — METOPROLOL SUCCINATE 25 MG/1
25 TABLET, EXTENDED RELEASE ORAL DAILY
Qty: 90 TABLET | Refills: 0 | Status: SHIPPED | OUTPATIENT
Start: 2024-04-23

## 2024-04-23 RX ORDER — BUSPIRONE HYDROCHLORIDE 10 MG/1
10 TABLET ORAL
Qty: 90 TABLET | Refills: 0 | Status: SHIPPED | OUTPATIENT
Start: 2024-04-23

## 2024-04-23 RX ORDER — BUSPIRONE HYDROCHLORIDE 10 MG/1
10 TABLET ORAL
COMMUNITY
End: 2024-04-23 | Stop reason: SDUPTHER

## 2024-04-23 RX ORDER — LEVOTHYROXINE SODIUM 0.2 MG/1
200 TABLET ORAL DAILY
Qty: 90 TABLET | Refills: 0 | Status: SHIPPED | OUTPATIENT
Start: 2024-04-23

## 2024-04-23 SDOH — ECONOMIC STABILITY - INCOME SECURITY: PROBLEM RELATED TO HOUSING AND ECONOMIC CIRCUMSTANCES, UNSPECIFIED: Z59.9

## 2024-04-23 NOTE — ASSESSMENT & PLAN NOTE
No labs since 2021  Was previously on statin and Zetia, but not currently taking these medications  Will plan for labs at follow-up visit and restart her medications given her history

## 2024-04-23 NOTE — ASSESSMENT & PLAN NOTE
I do not believe patient is currently on any medications for this   Current Blood Pressure: 135/75  Will need to continue to monitor  Restart prior meds: Toprol and Losartan  Will have patient return in 2 weeks for BP check

## 2024-04-23 NOTE — ASSESSMENT & PLAN NOTE
Lab Results   Component Value Date    EGFR 88 12/13/2023    EGFR 96 11/15/2023    EGFR 72 09/08/2023    CREATININE 0.75 12/13/2023    CREATININE 0.69 11/15/2023    CREATININE 0.89 09/08/2023     Recently labs with stage 2 CKD, will update labs and continue to monitor.

## 2024-04-23 NOTE — ASSESSMENT & PLAN NOTE
Currently uncontrolled at this time had TSH greater than 100 last week from labs completed by her prior PCP  Patient currently admits to taking levothyroxine 200 mcg daily and denies any missed doses.   Will check thyroid levels at this time.

## 2024-04-23 NOTE — ASSESSMENT & PLAN NOTE
2015?  Does not appear patient is on any medications for this at this time.  She does appear to have been on asa, lipitor and plavix in the past Per pharmacy she has not filled any of these prescriptions within the last year.  Will obtain labs and restart her meds: ASA, Lipitor, Plavix, Toprol

## 2024-04-23 NOTE — PATIENT INSTRUCTIONS
Medicare Preventive Visit Patient Instructions  Thank you for completing your Welcome to Medicare Visit or Medicare Annual Wellness Visit today. Your next wellness visit will be due in one year (4/24/2025).  The screening/preventive services that you may require over the next 5-10 years are detailed below. Some tests may not apply to you based off risk factors and/or age. Screening tests ordered at today's visit but not completed yet may show as past due. Also, please note that scanned in results may not display below.  Preventive Screenings:  Service Recommendations Previous Testing/Comments   Colorectal Cancer Screening  * Colonoscopy    * Fecal Occult Blood Test (FOBT)/Fecal Immunochemical Test (FIT)  * Fecal DNA/Cologuard Test  * Flexible Sigmoidoscopy Age: 45-75 years old   Colonoscopy: every 10 years (may be performed more frequently if at higher risk)  OR  FOBT/FIT: every 1 year  OR  Cologuard: every 3 years  OR  Sigmoidoscopy: every 5 years  Screening may be recommended earlier than age 45 if at higher risk for colorectal cancer. Also, an individualized decision between you and your healthcare provider will decide whether screening between the ages of 76-85 would be appropriate. Colonoscopy: Not on file  FOBT/FIT: Not on file  Cologuard: Not on file  Sigmoidoscopy: Not on file          Breast Cancer Screening Age: 40+ years old  Frequency: every 1-2 years  Not required if history of left and right mastectomy Mammogram: Not on file        Cervical Cancer Screening Between the ages of 21-29, pap smear recommended once every 3 years.   Between the ages of 30-65, can perform pap smear with HPV co-testing every 5 years.   Recommendations may differ for women with a history of total hysterectomy, cervical cancer, or abnormal pap smears in past. Pap Smear: Not on file        Hepatitis C Screening Once for adults born between 1945 and 1965  More frequently in patients at high risk for Hepatitis C Hep C Antibody:  10/16/2020        Diabetes Screening 1-2 times per year if you're at risk for diabetes or have pre-diabetes Fasting glucose: No results in last 5 years (No results in last 5 years)  A1C: 5.6 % (6/13/2021)      Cholesterol Screening Once every 5 years if you don't have a lipid disorder. May order more often based on risk factors. Lipid panel: 06/13/2021          Other Preventive Screenings Covered by Medicare:  Abdominal Aortic Aneurysm (AAA) Screening: covered once if your at risk. You're considered to be at risk if you have a family history of AAA.  Lung Cancer Screening: covers low dose CT scan once per year if you meet all of the following conditions: (1) Age 55-77; (2) No signs or symptoms of lung cancer; (3) Current smoker or have quit smoking within the last 15 years; (4) You have a tobacco smoking history of at least 20 pack years (packs per day multiplied by number of years you smoked); (5) You get a written order from a healthcare provider.  Glaucoma Screening: covered annually if you're considered high risk: (1) You have diabetes OR (2) Family history of glaucoma OR (3)  aged 50 and older OR (4)  American aged 65 and older  Osteoporosis Screening: covered every 2 years if you meet one of the following conditions: (1) You're estrogen deficient and at risk for osteoporosis based off medical history and other findings; (2) Have a vertebral abnormality; (3) On glucocorticoid therapy for more than 3 months; (4) Have primary hyperparathyroidism; (5) On osteoporosis medications and need to assess response to drug therapy.   Last bone density test (DXA Scan): Not on file.  HIV Screening: covered annually if you're between the age of 15-65. Also covered annually if you are younger than 15 and older than 65 with risk factors for HIV infection. For pregnant patients, it is covered up to 3 times per pregnancy.    Immunizations:  Immunization Recommendations   Influenza Vaccine Annual influenza  vaccination during flu season is recommended for all persons aged >= 6 months who do not have contraindications   Pneumococcal Vaccine   * Pneumococcal conjugate vaccine = PCV13 (Prevnar 13), PCV15 (Vaxneuvance), PCV20 (Prevnar 20)  * Pneumococcal polysaccharide vaccine = PPSV23 (Pneumovax) Adults 19-65 yo with certain risk factors or if 65+ yo  If never received any pneumonia vaccine: recommend Prevnar 20 (PCV20)  Give PCV20 if previously received 1 dose of PCV13 or PPSV23   Hepatitis B Vaccine 3 dose series if at intermediate or high risk (ex: diabetes, end stage renal disease, liver disease)   Respiratory syncytial virus (RSV) Vaccine - COVERED BY MEDICARE PART D  * RSVPreF3 (Arexvy) CDC recommends that adults 60 years of age and older may receive a single dose of RSV vaccine using shared clinical decision-making (SCDM)   Tetanus (Td) Vaccine - COST NOT COVERED BY MEDICARE PART B Following completion of primary series, a booster dose should be given every 10 years to maintain immunity against tetanus. Td may also be given as tetanus wound prophylaxis.   Tdap Vaccine - COST NOT COVERED BY MEDICARE PART B Recommended at least once for all adults. For pregnant patients, recommended with each pregnancy.   Shingles Vaccine (Shingrix) - COST NOT COVERED BY MEDICARE PART B  2 shot series recommended in those 19 years and older who have or will have weakened immune systems or those 50 years and older     Health Maintenance Due:      Topic Date Due   • Breast Cancer Screening: Mammogram  Never done   • Colorectal Cancer Screening  Never done   • Hepatitis C Screening  Completed     Immunizations Due:      Topic Date Due   • Pneumococcal Vaccine: 65+ Years (2 of 2 - PCV) 05/15/2021   • Influenza Vaccine (1) 09/01/2023   • COVID-19 Vaccine (1 - 2023-24 season) Never done     Advance Directives   What are advance directives?  Advance directives are legal documents that state your wishes and plans for medical care. These  plans are made ahead of time in case you lose your ability to make decisions for yourself. Advance directives can apply to any medical decision, such as the treatments you want, and if you want to donate organs.   What are the types of advance directives?  There are many types of advance directives, and each state has rules about how to use them. You may choose a combination of any of the following:  Living will:  This is a written record of the treatment you want. You can also choose which treatments you do not want, which to limit, and which to stop at a certain time. This includes surgery, medicine, IV fluid, and tube feedings.   Durable power of  for healthcare (DPAHC):  This is a written record that states who you want to make healthcare choices for you when you are unable to make them for yourself. This person, called a proxy, is usually a family member or a friend. You may choose more than 1 proxy.  Do not resuscitate (DNR) order:  A DNR order is used in case your heart stops beating or you stop breathing. It is a request not to have certain forms of treatment, such as CPR. A DNR order may be included in other types of advance directives.  Medical directive:  This covers the care that you want if you are in a coma, near death, or unable to make decisions for yourself. You can list the treatments you want for each condition. Treatment may include pain medicine, surgery, blood transfusions, dialysis, IV or tube feedings, and a ventilator (breathing machine).  Values history:  This document has questions about your views, beliefs, and how you feel and think about life. This information can help others choose the care that you would choose.  Why are advance directives important?  An advance directive helps you control your care. Although spoken wishes may be used, it is better to have your wishes written down. Spoken wishes can be misunderstood, or not followed. Treatments may be given even if you do not  want them. An advance directive may make it easier for your family to make difficult choices about your care.   Urinary Incontinence   Urinary incontinence (UI)  is when you lose control of your bladder. UI develops because your bladder cannot store or empty urine properly. The 3 most common types of UI are stress incontinence, urge incontinence, or both.  Medicines:   May be given to help strengthen your bladder control. Report any side effects of medication to your healthcare provider.  Do pelvic muscle exercises often:  Your pelvic muscles help you stop urinating. Squeeze these muscles tight for 5 seconds, then relax for 5 seconds. Gradually work up to squeezing for 10 seconds. Do 3 sets of 15 repetitions a day, or as directed. This will help strengthen your pelvic muscles and improve bladder control.  Train your bladder:  Go to the bathroom at set times, such as every 2 hours, even if you do not feel the urge to go. You can also try to hold your urine when you feel the urge to go. For example, hold your urine for 5 minutes when you feel the urge to go. As that becomes easier, hold your urine for 10 minutes.   Self-care:   Keep a UI record.  Write down how often you leak urine and how much you leak. Make a note of what you were doing when you leaked urine.  Drink liquids as directed. You may need to limit the amount of liquid you drink to help control your urine leakage. Do not drink any liquid right before you go to bed. Limit or do not have drinks that contain caffeine or alcohol.   Prevent constipation.  Eat a variety of high-fiber foods. Good examples are high-fiber cereals, beans, vegetables, and whole-grain breads. Walking is the best way to trigger your intestines to have a bowel movement.  Exercise regularly and maintain a healthy weight.  Weight loss and exercise will decrease pressure on your bladder and help you control your leakage.   Use a catheter as directed  to help empty your bladder. A catheter  is a tiny, plastic tube that is put into your bladder to drain your urine.   Go to behavior therapy as directed.  Behavior therapy may be used to help you learn to control your urge to urinate.    Cigarette Smoking and Your Health   Risks to your health if you smoke:  Nicotine and other chemicals found in tobacco damage every cell in your body. Even if you are a light smoker, you have an increased risk for cancer, heart disease, and lung disease. If you are pregnant or have diabetes, smoking increases your risk for complications.   Benefits to your health if you stop smoking:   You decrease respiratory symptoms such as coughing, wheezing, and shortness of breath.   You reduce your risk for cancers of the lung, mouth, throat, kidney, bladder, pancreas, stomach, and cervix. If you already have cancer, you increase the benefits of chemotherapy. You also reduce your risk for cancer returning or a second cancer from developing.   You reduce your risk for heart disease, blood clots, heart attack, and stroke.   You reduce your risk for lung infections, and diseases such as pneumonia, asthma, chronic bronchitis, and emphysema.  Your circulation improves. More oxygen can be delivered to your body. If you have diabetes, you lower your risk for complications, such as kidney, artery, and eye diseases. You also lower your risk for nerve damage. Nerve damage can lead to amputations, poor vision, and blindness.  You improve your body's ability to heal and to fight infections.  For more information and support to stop smoking:   [employer]  Phone: 7- 538 - 776-7559  Web Address: www.flave[employer]  Weight Management   Why it is important to manage your weight:  Being overweight increases your risk of health conditions such as heart disease, high blood pressure, type 2 diabetes, and certain types of cancer. It can also increase your risk for osteoarthritis, sleep apnea, and other respiratory problems. Aim for a slow, steady weight  loss. Even a small amount of weight loss can lower your risk of health problems.  How to lose weight safely:  A safe and healthy way to lose weight is to eat fewer calories and get regular exercise. You can lose up about 1 pound a week by decreasing the number of calories you eat by 500 calories each day.   Healthy meal plan for weight management:  A healthy meal plan includes a variety of foods, contains fewer calories, and helps you stay healthy. A healthy meal plan includes the following:  Eat whole-grain foods more often.  A healthy meal plan should contain fiber. Fiber is the part of grains, fruits, and vegetables that is not broken down by your body. Whole-grain foods are healthy and provide extra fiber in your diet. Some examples of whole-grain foods are whole-wheat breads and pastas, oatmeal, brown rice, and bulgur.  Eat a variety of vegetables every day.  Include dark, leafy greens such as spinach, kale, miguel greens, and mustard greens. Eat yellow and orange vegetables such as carrots, sweet potatoes, and winter squash.   Eat a variety of fruits every day.  Choose fresh or canned fruit (canned in its own juice or light syrup) instead of juice. Fruit juice has very little or no fiber.  Eat low-fat dairy foods.  Drink fat-free (skim) milk or 1% milk. Eat fat-free yogurt and low-fat cottage cheese. Try low-fat cheeses such as mozzarella and other reduced-fat cheeses.  Choose meat and other protein foods that are low in fat.  Choose beans or other legumes such as split peas or lentils. Choose fish, skinless poultry (chicken or turkey), or lean cuts of red meat (beef or pork). Before you cook meat or poultry, cut off any visible fat.   Use less fat and oil.  Try baking foods instead of frying them. Add less fat, such as margarine, sour cream, regular salad dressing and mayonnaise to foods. Eat fewer high-fat foods. Some examples of high-fat foods include french fries, doughnuts, ice cream, and cakes.  Eat  fewer sweets.  Limit foods and drinks that are high in sugar. This includes candy, cookies, regular soda, and sweetened drinks.  Exercise:  Exercise at least 30 minutes per day on most days of the week. Some examples of exercise include walking, biking, dancing, and swimming. You can also fit in more physical activity by taking the stairs instead of the elevator or parking farther away from stores. Ask your healthcare provider about the best exercise plan for you.      © Copyright [employer] 2018 Information is for End User's use only and may not be sold, redistributed or otherwise used for commercial purposes. All illustrations and images included in CareNotes® are the copyrighted property of [employer] or [employer]

## 2024-04-23 NOTE — ASSESSMENT & PLAN NOTE
Was previously documented as alzheimer's dementia, however given her age and history of CVA, MI, I believe this is vascular dementia. Resume patients Aricept 10mg daily. MMSE today is 27/30. Continue to monitor.

## 2024-04-23 NOTE — ASSESSMENT & PLAN NOTE
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 Patient currently ambulating with a walker.  Her goal would be to return to walking with a cane and overall improve her ambulatory status.  She never completed any aggressive therapy following her stroke. She started with outpatient PT last week and will be planning to do this 3x/ week. Will continue to monitor

## 2024-04-23 NOTE — ASSESSMENT & PLAN NOTE
Was previously on Protonix/omeprazole, do not believe patient is taking this based on pharmacy provided information.    Restart Omeprazole 20mg daily  Will continue to monitor symptoms

## 2024-04-23 NOTE — ASSESSMENT & PLAN NOTE
History of MI per patient, without stent placement? (History unclear)  Cardiology saw patient in hospital 6/2021 - patient with NSTEMI/Takotsubo cardiomyopathy thought to be 2/2 uncontrolled hypothyroidism. Echo at the time EF 30-35%. Patient discharged when thyroid improved and was to f/u with cardiology and have follow up echo but never did.   Does not currently follow with cardiology. Patient not sure of medications. Per pharmacy patient uses, she is not currently taking any of the previously prescribed medications: asa, atorvastatin, toprol, losartan  Will continue to attempt to obtain records from prior PCP  Patient now restarted on medications below  F/u 3 mo

## 2024-04-23 NOTE — TELEPHONE ENCOUNTER
----- Message from Mallika Smiley, DO sent at 4/23/2024  3:23 PM EDT -----  Can we have patient scheduled for 2 weeks from now for BP check visit? Please let me know once this is scheduled. Thank you

## 2024-04-23 NOTE — ASSESSMENT & PLAN NOTE
Refill prozac, and restart patient on Buspar at bedtime as previously she reports this really helped. Continue to monitor

## 2024-04-23 NOTE — PROGRESS NOTES
Assessment and Plan:     Problem List Items Addressed This Visit       Acquired hypothyroidism     Currently uncontrolled at this time had TSH greater than 100 last week from labs completed by her prior PCP  Patient currently admits to taking levothyroxine 200 mcg daily and denies any missed doses.   Will check thyroid levels at this time.          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 Patient currently ambulating with a walker.  Her goal would be to return to walking with a cane and overall improve her ambulatory status.  She never completed any aggressive therapy following her stroke. She started with outpatient PT last week and will be planning to do this 3x/ week. Will continue to monitor         Coronary artery disease involving native coronary artery of native heart without angina pectoris     History of MI per patient, without stent placement? (History unclear)  Cardiology saw patient in hospital 6/2021 - patient with NSTEMI/Takotsubo cardiomyopathy thought to be 2/2 uncontrolled hypothyroidism. Echo at the time EF 30-35%. Patient discharged when thyroid improved and was to f/u with cardiology and have follow up echo but never did.   Does not currently follow with cardiology. Patient not sure of medications. Per pharmacy patient uses, she is not currently taking any of the previously prescribed medications: asa, atorvastatin, toprol, losartan  Will continue to attempt to obtain records from prior PCP  Patient now restarted on medications below  F/u 3 mo         Relevant Medications    aspirin 81 mg chewable tablet    atorvastatin (LIPITOR) 80 mg tablet    clopidogrel (PLAVIX) 75 mg tablet    metoprolol succinate (TOPROL-XL) 25 mg 24 hr tablet    Other Relevant Orders    CBC and differential     Comprehensive metabolic panel    Lipid panel    Echo complete w/ contrast if indicated    Crohn's disease (HCC)     Patient reports a history of Crohn's disease, not currently on any medications.  She denies any acute symptoms at this time.  Will continue to monitor         Relevant Orders    Ambulatory Referral to Gastroenterology    SEUN (generalized anxiety disorder)     Refill prozac, and restart patient on Buspar at bedtime as previously she reports this really helped. Continue to monitor         Relevant Medications    FLUoxetine (PROzac) 40 MG capsule    busPIRone (BUSPAR) 10 mg tablet    donepezil (Aricept) 10 mg tablet    Gastroesophageal reflux disease without esophagitis     Was previously on Protonix/omeprazole, do not believe patient is taking this based on pharmacy provided information.    Restart Omeprazole 20mg daily  Will continue to monitor symptoms         Relevant Medications    omeprazole (PriLOSEC) 20 mg delayed release capsule    History of CVA with residual deficit     2015?  Does not appear patient is on any medications for this at this time.  She does appear to have been on asa, lipitor and plavix in the past Per pharmacy she has not filled any of these prescriptions within the last year.  Will obtain labs and restart her meds: ASA, Lipitor, Plavix, Toprol         Relevant Medications    aspirin 81 mg chewable tablet    atorvastatin (LIPITOR) 80 mg tablet    clopidogrel (PLAVIX) 75 mg tablet    Other Relevant Orders    Lipid panel    Hyperlipidemia     No labs since 2021  Was previously on statin and Zetia, but not currently taking these medications  Will plan for labs at follow-up visit and restart her medications given her history         Relevant Medications    atorvastatin (LIPITOR) 80 mg tablet    ezetimibe (ZETIA) 10 mg tablet    Other Relevant Orders    Lipid panel    Hypertension     I do not believe patient is currently on any medications for this   Current Blood Pressure:  135/75  Will need to continue to monitor  Restart prior meds: Toprol and Losartan  Will have patient return in 2 weeks for BP check         Relevant Medications    metoprolol succinate (TOPROL-XL) 25 mg 24 hr tablet    losartan (COZAAR) 25 mg tablet    Other Relevant Orders    Comprehensive metabolic panel    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 Resume patients Aricept 10mg daily. MMSE today is 27/30. Continue to monitor.          Relevant Medications    FLUoxetine (PROzac) 40 MG capsule    atorvastatin (LIPITOR) 80 mg tablet    busPIRone (BUSPAR) 10 mg tablet    donepezil (Aricept) 10 mg tablet    Other Relevant Orders    Lipid panel    Ambulatory Referral to Social Work Care Management Program    Stage 3b chronic kidney disease (HCC)     Lab Results   Component Value Date    EGFR 88 12/13/2023    EGFR 96 11/15/2023    EGFR 72 09/08/2023    CREATININE 0.75 12/13/2023    CREATININE 0.69 11/15/2023    CREATININE 0.89 09/08/2023     Recently labs with stage 2 CKD, will update labs and continue to monitor.          Relevant Orders    Comprehensive metabolic panel    Mixed stress and urge urinary incontinence     Recommend PT and regular kegal exercises. Continue to monitor.          Chronic allergic rhinitis     Restart zyrtec 10mg daily         Relevant Medications    cetirizine (ZyrTEC) 10 MG chewable tablet    guaiFENesin (MUCINEX) 600 mg 12 hr tablet     Other Visit Diagnoses       Medicare annual wellness visit, subsequent    -  Primary    Postmenopausal        Relevant Orders    DXA bone density spine hip and pelvis    Screening for colon cancer        Relevant Orders    Ambulatory Referral to Gastroenterology    Encounter for screening mammogram for breast cancer        Relevant Orders    Mammo screening bilateral w 3d & cad    Screening for AAA (abdominal  aortic aneurysm)        Relevant Orders    US abdominal aorta screening aaa    Smoking greater than 30 pack years        Relevant Orders    CT lung screening program    Financial difficulties        Relevant Orders    Ambulatory Referral to Social Work Care Management Program            Depression Screening and Follow-up Plan: Patient was screened for depression during today's encounter. They screened negative with a PHQ-2 score of 0.    Lung Cancer Screening Shared Decision Making: I discussed with her that she is a candidate for lung cancer CT screening.     The following Shared Decision-Making points were covered:  Benefits of screening were discussed, including the rates of reduction in death from lung cancer and other causes.  Harms of screening were reviewed, including false positive tests, radiation exposure levels, risks of invasive procedures, risks of complications of screening, and risk of overdiagnosis.  I counseled on the importance of adherence to annual lung cancer LDCT screening, impact of co-morbidities, and ability or willingness to undergo diagnosis and treatment.  I counseled on the importance of maintaining abstinence as a former smoker or was counseled on the importance of smoking cessation if a current smoker    Review of Eligibility Criteria: She meets all of the criteria for Lung Cancer Screening.   - She is 64 y.o.   - She has 20 pack year tobacco history and is a current smoker or has quit within the past 15 years  - She presents no signs or symptoms of lung cancer    After discussion, the patient decided to elect lung cancer screening.      Preventive health issues were discussed with patient, and age appropriate screening tests were ordered as noted in patient's After Visit Summary.  Patient due for DEXA bone density, breast, colon, and lung cancer screening. AAA screening also recommended given her history.   Personalized health advice and appropriate referrals for health education or  preventive services given if needed, as noted in patient's After Visit Summary.    Return in about 3 months (around 7/15/2024) for Recheck Chronic conditions .  Additionally have patient return in 2 weeks for BP recheck.      History of Present Illness:     Patient presents for a Medicare Wellness Visit     Patient Care Team:  Mallika Smiley DO as PCP - PCP-[facility] ([facility])     Review of Systems:     Review of Systems   Constitutional:  Positive for fatigue. Negative for chills and fever.   HENT:  Negative for congestion and sore throat.    Eyes:  Negative for visual disturbance.   Respiratory:  Negative for cough and shortness of breath.    Gastrointestinal:  Negative for constipation and diarrhea.   Endocrine: Negative for cold intolerance, heat intolerance, polydipsia and polyuria.   Genitourinary:  Negative for difficulty urinating, frequency and hematuria.   Musculoskeletal:  Positive for gait problem.   Skin:  Negative for rash.   Neurological:  Positive for dizziness.   Psychiatric/Behavioral:  Positive for dysphoric mood.         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)    Prolonged Q-T interval on ECG    Seasonal allergies    Stage 3b chronic kidney disease (HCC)    Takotsubo cardiomyopathy    Mixed stress and urge urinary incontinence    Class 3 severe obesity due to excess calories with serious comorbidity and body mass index (BMI) of 50.0 to 59.9 in adult (HCC)    Chronic allergic rhinitis      Past Medical and Surgical History:     Past Medical History:    Diagnosis Date    Allergic     Anemia     Asthma     Chronic kidney disease     COPD (chronic obstructive pulmonary disease) (HCC)     Crohn disease (HCC)     Dementia (HCC)     Diabetes mellitus (HCC)     borderline    Disease of thyroid gland     Emphysema lung (HCC)     GERD (gastroesophageal reflux disease)     Heart attack (HCC)     Per patient had 5    High cholesterol     Hypertension     Memory loss     Obesity     Stroke (HCC)      Past Surgical History:   Procedure Laterality Date    CYST REMOVAL N/A     on spine    MULTIPLE TOOTH EXTRACTIONS        Family History:     Family History   Problem Relation Age of Onset    Stroke Mother     Cancer Mother     Heart attack Father     Dementia Father     Stroke Sister     Heart attack Sister     HIV Sister     Stroke Brother     Heart attack Brother     Hodgkin's lymphoma Brother     Hypertension Son     Heart disease Son     Blindness Son     COPD Son     Heart disease Son     Emphysema Son     Cancer Maternal Aunt       Social History:     Social History     Socioeconomic History    Marital status: Single     Spouse name: None    Number of children: None    Years of education: None    Highest education level: None   Occupational History    None   Tobacco Use    Smoking status: Every Day     Types: Cigarettes     Passive exposure: Past    Smokeless tobacco: Never   Vaping Use    Vaping status: Never Used   Substance and Sexual Activity    Alcohol use: Yes     Alcohol/week: 2.0 standard drinks of alcohol     Types: 2 Standard drinks or equivalent per week    Drug use: Never    Sexual activity: Not Currently   Other Topics Concern    None   Social History Narrative    None     Social Determinants of Health     Financial Resource Strain: Not on file   Food Insecurity: No Food Insecurity (4/23/2024)    Hunger Vital Sign     Worried About Running Out of Food in the Last Year: Never true     Ran Out of Food in the Last Year: Never true   Transportation Needs: Unmet  Transportation Needs (4/23/2024)    PRAPARE - Transportation     Lack of Transportation (Medical): Yes     Lack of Transportation (Non-Medical): No   Physical Activity: Not on file   Stress: Not on file   Social Connections: Not on file   Intimate Partner Violence: Not on file   Housing Stability: Low Risk  (4/23/2024)    Housing Stability Vital Sign     Unable to Pay for Housing in the Last Year: No     Number of Places Lived in the Last Year: 1     Unstable Housing in the Last Year: No      Medications and Allergies:     Current Outpatient Medications   Medication Sig Dispense Refill    acetaminophen (TYLENOL) 325 mg tablet Take 650 mg by mouth every 4 (four) hours as needed      aspirin 81 mg chewable tablet Chew 1 tablet (81 mg total) daily 90 tablet 0    atorvastatin (LIPITOR) 80 mg tablet Take 1 tablet (80 mg total) by mouth daily 90 tablet 0    busPIRone (BUSPAR) 10 mg tablet Take 1 tablet (10 mg total) by mouth daily at bedtime 90 tablet 0    cetirizine (ZyrTEC) 10 MG chewable tablet Chew 1 tablet (10 mg total) daily 90 tablet 0    clopidogrel (PLAVIX) 75 mg tablet Take 1 tablet (75 mg total) by mouth daily 90 tablet 0    donepezil (Aricept) 10 mg tablet Take 1 tablet (10 mg total) by mouth daily at bedtime 90 tablet 0    ezetimibe (ZETIA) 10 mg tablet Take 1 tablet (10 mg total) by mouth daily 90 tablet 0    FLUoxetine (PROzac) 40 MG capsule Take 1 capsule (40 mg total) by mouth daily 90 capsule 0    guaiFENesin (MUCINEX) 600 mg 12 hr tablet Take 2 tablets (1,200 mg total) by mouth every 12 (twelve) hours for 10 days 40 tablet 0    levothyroxine 200 mcg tablet Take 1 tablet (200 mcg total) by mouth daily 90 tablet 0    losartan (COZAAR) 25 mg tablet Take 1 tablet (25 mg total) by mouth daily 90 tablet 0    metoprolol succinate (TOPROL-XL) 25 mg 24 hr tablet Take 1 tablet (25 mg total) by mouth daily 90 tablet 0    omeprazole (PriLOSEC) 20 mg delayed release capsule Take 1 capsule (20 mg total) by mouth  daily 90 capsule 0     No current facility-administered medications for this visit.     Allergies   Allergen Reactions    Apple - Food Allergy Vomiting    Ciprofloxacin Edema    Penicillins Edema    Strawberry (Diagnostic) - Food Allergy Itching      Immunizations:     Immunization History   Administered Date(s) Administered    INFLUENZA 11/07/2018, 10/16/2020    Influenza, Quadrivalent (nasal) 11/07/2018    Pneumococcal Polysaccharide PPV23 05/15/2020    Tdap 06/05/2014, 05/15/2020      Health Maintenance:         Topic Date Due    Breast Cancer Screening: Mammogram  Never done    Colorectal Cancer Screening  Never done    Hepatitis C Screening  Completed         Topic Date Due    Pneumococcal Vaccine: 65+ Years (2 of 2 - PCV) 05/15/2021    Influenza Vaccine (1) 09/01/2023    COVID-19 Vaccine (1 - 2023-24 season) Never done      Medicare Screening Tests and Risk Assessments:     Ginna is here for her Subsequent Wellness visit.     Health Risk Assessment:   Patient rates overall health as fair. Patient feels that their physical health rating is slightly worse. Patient is satisfied with their life. Eyesight was rated as same. Hearing was rated as same. Patient feels that their emotional and mental health rating is same. Patients states they are never, rarely angry. Patient states they are never, rarely unusually tired/fatigued. Pain experienced in the last 7 days has been none. Patient states that she has experienced no weight loss or gain in last 6 months. Patient now working with PT, just started last week and planning to do 3x per week.     Depression Screening:   PHQ-2 Score: 0      Fall Risk Screening:   In the past year, patient has experienced: no history of falling in past year      Urinary Incontinence Screening:   Patient has leaked urine accidently in the last six months. Previously referred to PT    Home Safety:  Patient has trouble with stairs inside or outside of their home. Patient has working smoke  alarms and has working carbon monoxide detector. Home safety hazards include: none.     Nutrition:   Current diet is Regular.     Medications:   Patient is currently taking over-the-counter supplements. OTC medications include: see medication list. Patient is able to manage medications.     Activities of Daily Living (ADLs)/Instrumental Activities of Daily Living (IADLs):   Walk and transfer into and out of bed and chair?: Yes  Dress and groom yourself?: Yes    Bathe or shower yourself?: Yes    Feed yourself? Yes  Do your laundry/housekeeping?: Yes  Manage your money, pay your bills and track your expenses?: Yes  Make your own meals?: Yes    Do your own shopping?: Yes    Previous Hospitalizations:   Any hospitalizations or ED visits within the last 12 months?: No      Cognitive Screening:   Provider or family/friend/caregiver concerned regarding cognition?: Yes  Mini-Mental Status Exam (MMSE) Score: 27  Interpretation: MMSE Score 24-30: Normal Cognition     Cognition Comments: MMSE completed today, scanned into chart    PREVENTIVE SCREENINGS      Cardiovascular Screening:    General: Screening Not Indicated and History Lipid Disorder    Due for: Lipid Panel      Diabetes Screening:     General: Screening Current    Due for: Blood Glucose      Colorectal Cancer Screening:     General: Risks and Benefits Discussed    Due for: Colonoscopy - High Risk      Breast Cancer Screening:     General: Risks and Benefits Discussed    Due for: Mammogram        Cervical Cancer Screening:    General: Screening Not Indicated      Osteoporosis Screening:    General: Risks and Benefits Discussed    Due for: DXA Axial      Abdominal Aortic Aneurysm (AAA) Screening:        General: Risks and Benefits Discussed    Due for: Screening AAA Ultrasound      Lung Cancer Screening:     General: Risks and Benefits Discussed    Due for: Low Dose CT (LDCT)      Hepatitis C Screening:    General: Screening Current    Screening, Brief Intervention,  "and Referral to Treatment (SBIRT)    Screening  Typical number of drinks in a day: 0  Typical number of drinks in a week: 0  Interpretation: Low risk drinking behavior.    Single Item Drug Screening:  How often have you used an illegal drug (including marijuana) or a prescription medication for non-medical reasons in the past year? never    Single Item Drug Screen Score: 0  Interpretation: Negative screen for possible drug use disorder    Brief Intervention  Alcohol & drug use screenings were reviewed. No concerns regarding substance use disorder identified.     SDOH Risk Assessment  Social determinants of health (SDOH) risk assesment tool was completed. The tool at a minimum covered housing stability, food insecurity, transportation needs, and utility difficulty. Patient had at risk responses for the following SDOH domains: transportation needs.     Other Counseling Topics:   Calcium and vitamin D intake and regular weightbearing exercise.     PHQ-2/9 Depression Screening    Little interest or pleasure in doing things: 0 - not at all  Feeling down, depressed, or hopeless: 0 - not at all  PHQ-2 Score: 0  PHQ-2 Interpretation: Negative depression screen         No results found.     Physical Exam:     /75   Pulse 74   Ht 5' 6\" (1.676 m)   Wt (!) 156 kg (343 lb 14.7 oz)   SpO2 100%   BMI 55.51 kg/m²     Physical Exam  Vitals reviewed.   Constitutional:       General: She is not in acute distress.     Appearance: She is obese. She is not ill-appearing.      Comments: Ambulates with walker   HENT:      Head: Normocephalic and atraumatic.   Neck:      Vascular: No carotid bruit.   Cardiovascular:      Rate and Rhythm: Normal rate.   Pulmonary:      Effort: Pulmonary effort is normal.      Breath sounds: Normal breath sounds.   Musculoskeletal:      Cervical back: Neck supple.   Neurological:      Mental Status: She is alert.   Psychiatric:         Mood and Affect: Mood normal.         Behavior: Behavior " normal.         Thought Content: Thought content normal.          Mallika Smiley, DO

## 2024-04-30 ENCOUNTER — DOCTOR'S OFFICE (OUTPATIENT)
Dept: URBAN - NONMETROPOLITAN AREA CLINIC 1 | Facility: CLINIC | Age: 64
Setting detail: OPHTHALMOLOGY
End: 2024-04-30
Payer: MEDICARE

## 2024-04-30 DIAGNOSIS — H40.1132: ICD-10-CM

## 2024-04-30 PROCEDURE — 92083 EXTENDED VISUAL FIELD XM: CPT | Performed by: OPHTHALMOLOGY

## 2024-04-30 PROCEDURE — 99213 OFFICE O/P EST LOW 20 MIN: CPT | Performed by: OPHTHALMOLOGY

## 2024-09-09 ENCOUNTER — CONSULT (OUTPATIENT)
Dept: FAMILY MEDICINE CLINIC | Facility: CLINIC | Age: 64
End: 2024-09-09
Payer: COMMERCIAL

## 2024-09-09 ENCOUNTER — APPOINTMENT (OUTPATIENT)
Dept: LAB | Facility: CLINIC | Age: 64
End: 2024-09-09
Payer: COMMERCIAL

## 2024-09-09 VITALS
OXYGEN SATURATION: 98 % | HEIGHT: 66 IN | SYSTOLIC BLOOD PRESSURE: 140 MMHG | WEIGHT: 293 LBS | BODY MASS INDEX: 47.09 KG/M2 | DIASTOLIC BLOOD PRESSURE: 80 MMHG | HEART RATE: 80 BPM

## 2024-09-09 DIAGNOSIS — F01.B3 MODERATE VASCULAR DEMENTIA WITH MOOD DISTURBANCE (HCC): ICD-10-CM

## 2024-09-09 DIAGNOSIS — E03.9 ACQUIRED HYPOTHYROIDISM: ICD-10-CM

## 2024-09-09 DIAGNOSIS — I10 PRIMARY HYPERTENSION: ICD-10-CM

## 2024-09-09 DIAGNOSIS — E78.2 MIXED HYPERLIPIDEMIA: ICD-10-CM

## 2024-09-09 DIAGNOSIS — I25.10 CORONARY ARTERY DISEASE INVOLVING NATIVE CORONARY ARTERY OF NATIVE HEART WITHOUT ANGINA PECTORIS: ICD-10-CM

## 2024-09-09 DIAGNOSIS — Z01.818 PRE-OP EXAMINATION: ICD-10-CM

## 2024-09-09 DIAGNOSIS — Z59.9 FINANCIAL DIFFICULTIES: ICD-10-CM

## 2024-09-09 DIAGNOSIS — I25.10 CORONARY ARTERY DISEASE INVOLVING NATIVE CORONARY ARTERY OF NATIVE HEART WITHOUT ANGINA PECTORIS: Primary | ICD-10-CM

## 2024-09-09 LAB
ALBUMIN SERPL BCG-MCNC: 4.2 G/DL (ref 3.5–5)
ALP SERPL-CCNC: 157 U/L (ref 34–104)
ALT SERPL W P-5'-P-CCNC: 15 U/L (ref 7–52)
ANION GAP SERPL CALCULATED.3IONS-SCNC: 12 MMOL/L (ref 4–13)
AST SERPL W P-5'-P-CCNC: 21 U/L (ref 13–39)
BASOPHILS # BLD AUTO: 0.06 THOUSANDS/ÂΜL (ref 0–0.1)
BASOPHILS NFR BLD AUTO: 1 % (ref 0–1)
BILIRUB SERPL-MCNC: 1.15 MG/DL (ref 0.2–1)
BUN SERPL-MCNC: 13 MG/DL (ref 5–25)
CALCIUM SERPL-MCNC: 11 MG/DL (ref 8.4–10.2)
CHLORIDE SERPL-SCNC: 101 MMOL/L (ref 96–108)
CHOLEST SERPL-MCNC: 215 MG/DL
CO2 SERPL-SCNC: 29 MMOL/L (ref 21–32)
CREAT SERPL-MCNC: 1.15 MG/DL (ref 0.6–1.3)
EOSINOPHIL # BLD AUTO: 0.21 THOUSAND/ÂΜL (ref 0–0.61)
EOSINOPHIL NFR BLD AUTO: 4 % (ref 0–6)
ERYTHROCYTE [DISTWIDTH] IN BLOOD BY AUTOMATED COUNT: 16.2 % (ref 11.6–15.1)
GFR SERPL CREATININE-BSD FRML MDRD: 49 ML/MIN/1.73SQ M
GLUCOSE P FAST SERPL-MCNC: 88 MG/DL (ref 65–99)
HCT VFR BLD AUTO: 44.6 % (ref 34.8–46.1)
HDLC SERPL-MCNC: 51 MG/DL
HGB BLD-MCNC: 14 G/DL (ref 11.5–15.4)
IMM GRANULOCYTES # BLD AUTO: 0.03 THOUSAND/UL (ref 0–0.2)
IMM GRANULOCYTES NFR BLD AUTO: 1 % (ref 0–2)
LDLC SERPL CALC-MCNC: 134 MG/DL (ref 0–100)
LYMPHOCYTES # BLD AUTO: 2.19 THOUSANDS/ÂΜL (ref 0.6–4.47)
LYMPHOCYTES NFR BLD AUTO: 43 % (ref 14–44)
MCH RBC QN AUTO: 28.5 PG (ref 26.8–34.3)
MCHC RBC AUTO-ENTMCNC: 31.4 G/DL (ref 31.4–37.4)
MCV RBC AUTO: 91 FL (ref 82–98)
MONOCYTES # BLD AUTO: 0.36 THOUSAND/ÂΜL (ref 0.17–1.22)
MONOCYTES NFR BLD AUTO: 7 % (ref 4–12)
NEUTROPHILS # BLD AUTO: 2.21 THOUSANDS/ÂΜL (ref 1.85–7.62)
NEUTS SEG NFR BLD AUTO: 44 % (ref 43–75)
NONHDLC SERPL-MCNC: 164 MG/DL
NRBC BLD AUTO-RTO: 0 /100 WBCS
PLATELET # BLD AUTO: 351 THOUSANDS/UL (ref 149–390)
PMV BLD AUTO: 10.4 FL (ref 8.9–12.7)
POTASSIUM SERPL-SCNC: 3.8 MMOL/L (ref 3.5–5.3)
PROT SERPL-MCNC: 7.6 G/DL (ref 6.4–8.4)
RBC # BLD AUTO: 4.92 MILLION/UL (ref 3.81–5.12)
SODIUM SERPL-SCNC: 142 MMOL/L (ref 135–147)
T4 FREE SERPL-MCNC: <0.25 NG/DL (ref 0.61–1.12)
TRIGL SERPL-MCNC: 149 MG/DL
TSH SERPL DL<=0.05 MIU/L-ACNC: 165.8 UIU/ML (ref 0.45–4.5)
WBC # BLD AUTO: 5.06 THOUSAND/UL (ref 4.31–10.16)

## 2024-09-09 PROCEDURE — 85025 COMPLETE CBC W/AUTO DIFF WBC: CPT

## 2024-09-09 PROCEDURE — G2211 COMPLEX E/M VISIT ADD ON: HCPCS | Performed by: FAMILY MEDICINE

## 2024-09-09 PROCEDURE — 84439 ASSAY OF FREE THYROXINE: CPT

## 2024-09-09 PROCEDURE — 80053 COMPREHEN METABOLIC PANEL: CPT

## 2024-09-09 PROCEDURE — 80061 LIPID PANEL: CPT

## 2024-09-09 PROCEDURE — 36415 COLL VENOUS BLD VENIPUNCTURE: CPT

## 2024-09-09 PROCEDURE — 99214 OFFICE O/P EST MOD 30 MIN: CPT | Performed by: FAMILY MEDICINE

## 2024-09-09 PROCEDURE — 84443 ASSAY THYROID STIM HORMONE: CPT

## 2024-09-09 SDOH — ECONOMIC STABILITY - INCOME SECURITY: PROBLEM RELATED TO HOUSING AND ECONOMIC CIRCUMSTANCES, UNSPECIFIED: Z59.9

## 2024-09-09 NOTE — PROGRESS NOTES
"PRE-OPERATIVE EXAMINATION  Ginna Landeros  1960    Ginna Landeros is a 64 y.o. female with suspected endometrial cancer, CAD, HTN, cardiomyopathy, GERD, hypothyroidism, MCI, CKD, SEUN, ambulatory dysfunction, history of CVA, obesity, HLD who is planning to undergo Hysteroscopy and possible Hysterectomy under IV sedation and if hysterectomy is needed she would be under general   by Dr. Zimmer on 9/20/2024. The procedure is indicated for the following condition: Suspected endometrial cancer. Patient has not had complications with anesthesia in the past.    ROS:   Chest pain: no   Shortness of breath: no  Shortness of breath with exertion: no  Orthopnea: no  Dizziness: yes  Unexplained weight change: yes - Has had declining appetite and is down 25lb since April visit     PMH:  CAD: yes  HTN: yes  CKD: yes  DM: no on insulin: no  History of CVA: yes    She  reports that she has been smoking cigarettes. She has been exposed to tobacco smoke. She has never used smokeless tobacco. She reports current alcohol use of about 2.0 standard drinks of alcohol per week. She reports that she does not use drugs.    She smokes about 1/2 ppd     /80 (BP Location: Left arm, Patient Position: Sitting, Cuff Size: Large)   Pulse 80   Ht 5' 6\" (1.676 m)   Wt (!) 144 kg (317 lb 3.9 oz)   SpO2 98%   BMI 51.20 kg/m²       Physical Exam  Vitals reviewed.   Constitutional:       General: She is not in acute distress.     Appearance: She is obese. She is not ill-appearing.      Comments: Ambulates with walker   HENT:      Head: Normocephalic and atraumatic.   Neck:      Vascular: No carotid bruit.   Cardiovascular:      Rate and Rhythm: Normal rate and regular rhythm.      Heart sounds: Normal heart sounds.   Pulmonary:      Effort: Pulmonary effort is normal.      Breath sounds: Normal breath sounds.   Abdominal:      General: Abdomen is flat. Bowel sounds are normal.      Palpations: Abdomen is soft.      Tenderness: There is " abdominal tenderness (LLQ).   Musculoskeletal:      Cervical back: Neck supple.      Right lower leg: No edema.      Left lower leg: No edema.   Neurological:      Mental Status: She is alert.   Psychiatric:         Mood and Affect: Mood normal.         Behavior: Behavior normal.         Thought Content: Thought content normal.         Revised Cardiac Risk Index (RCRI) for Pre-Operative Risk   (estimates risk of cardiac complications after noncardiac surgery)    High-risk surgery: No 0   Intraperitoneal, intrathoracic, suprainguinal vascular  History of ischemic heart disease: Yes +1  Hx of MI, (+) exercise test, current chest pain considered due to myocardial ischemia, use of nitrate therapy or ECG with pathological Q waves)  History of CHF: Yes +1  Pulmonary edema, B/L rales or S3 gallop; DÍAZ, orthopnea, PND, CXR showing pulmonary vascular redistribution)  History of cerebrovascular disease: Yes +1  Prior TIA or stroke  Pre-operative treatment with insulin: No 0  Pre-operative creatinine >2 mg/dL: No 0    RCRI Scoring:  3 points: Class IV Risk, 15% 30-day risk of death, MI, or cardiac arrest      Lab Results   Component Value Date    CREATININE 1.16 (H) 07/25/2024       Ginna was seen today for pre-op exam.    Diagnoses and all orders for this visit:    Pre-op examination  -     Cancel: CBC and differential; Future  -     Cancel: Comprehensive metabolic panel; Future  -     Cancel: CBC and differential  -     Cancel: Comprehensive metabolic panel    Coronary artery disease involving native coronary artery of native heart without angina pectoris  -     CBC and differential; Future  -     Comprehensive metabolic panel; Future  -     Lipid panel; Future    Mixed hyperlipidemia  -     CBC and differential; Future  -     Lipid panel; Future    Primary hypertension  -     CBC and differential; Future  -     Comprehensive metabolic panel; Future  -     Lipid panel; Future    Acquired hypothyroidism  -     CBC and  differential; Future  -     TSH, 3rd generation with Free T4 reflex; Future    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)  -     Ambulatory Referral to Social Work Care Management Program; Future    Financial difficulties  -     Ambulatory Referral to Social Work Care Management Program; Future      Echo cardiogram previously ordered - patient given number to call to schedule. Referral to social work CM to assist patient with needs. She is also due for other labs for mgmt of chronic conditions as she missed an appointment with me for follow up. She will get these done and follow up once available.     Recommendations:  Ginna Landeros is undergoing an elective Moderate Risk surgery, Hysteroscopy and future possible hysterectomy. She is RCRI class III risk (3 points for history MI, history of CVA, history of CHF) with 15% 30-day risk of death, MI, or cardiac arrest. She is medically optimized for surgery, but is recommended to get updated CBC, CMP, and Echocardiogram at this time. No pre-op forms were needed per patient. She is initially scheduled for low risk procedure - hysteroscopy. If consistent with endometrial cancer, they will likely proceed with hysterectomy at future date.

## 2024-09-09 NOTE — PATIENT INSTRUCTIONS
Call [phone] to schedule Mammogram screening, lung cancer screening and DEXA screening  Call [phone] option #3, to schedule screening colonoscopy with Dr. Anne in [place]

## 2024-09-10 ENCOUNTER — PATIENT OUTREACH (OUTPATIENT)
Dept: CASE MANAGEMENT | Facility: OTHER | Age: 64
End: 2024-09-10

## 2024-09-10 NOTE — PROGRESS NOTES
JEAN ZEPEDA received referral for patient due to Dementia and financial concerns noted.  JEAN ZEPEDA reviewed patient's chart.  She has a friend-Yolanda on her Medical Communication Consent form, although her son is not listed.    JEAN ZEPEDA reached out to patient and left a voicemail.  JEAN ZEPEDA also reached out to friend-Yolanda regarding the above. Phone number listed is not the correct number, it was for the [place].  JEAN ZEPEDA reviewed Communication Consent form and edited phone number in Dem[employer]. JEAN ZEPEDA reached out to corrected phone number for Yolanda and left a voicemail.

## 2024-09-13 ENCOUNTER — TELEPHONE (OUTPATIENT)
Age: 64
End: 2024-09-13

## 2024-09-13 NOTE — TELEPHONE ENCOUNTER
"Reporting that patient thyroid lab levels are \"all over the place\" and asking for advice from PCP. Please review  "

## 2024-09-16 NOTE — TELEPHONE ENCOUNTER
Called Ginna to find out if she is still taking her tyroid medication and if so what dose told her to call the office back so we can get Ginna the proper care that is needed

## 2024-09-18 ENCOUNTER — TELEPHONE (OUTPATIENT)
Dept: FAMILY MEDICINE CLINIC | Facility: CLINIC | Age: 64
End: 2024-09-18

## 2024-09-18 ENCOUNTER — PATIENT OUTREACH (OUTPATIENT)
Dept: CASE MANAGEMENT | Facility: OTHER | Age: 64
End: 2024-09-18

## 2024-09-18 NOTE — PROGRESS NOTES
OPCM SW attempted second outreach to patient's friend-Yolanda regarding referral.  OPCM DUANE left a voicemail and sent UTR letter to address on file

## 2024-09-18 NOTE — TELEPHONE ENCOUNTER
----- Message from Mallika Smiley DO sent at 9/17/2024  8:21 PM EDT -----  Regarding: Needs appointment  Please call patient and schedule for follow up for her thyroid as soon as possible. Her labs were abnormal and we need a visit to discuss further.     Dr. Smiley

## 2024-09-24 ENCOUNTER — TELEPHONE (OUTPATIENT)
Dept: FAMILY MEDICINE CLINIC | Facility: CLINIC | Age: 64
End: 2024-09-24

## 2024-09-24 NOTE — TELEPHONE ENCOUNTER
Spoke with Ginna and rescheduled her appointment for 10/02/2024 @11:20 am. Patient also stated that she is taking her Thyroid medication every day but its hard to keep it down since she's been vomiting a lot. She also stated that she is drinking Insure but its making her very constipated.

## 2024-09-24 NOTE — TELEPHONE ENCOUNTER
I tried calling patient in regards to her tyroid medication, as well as her well being. I called her pharmacy on file and they said she has not picked up her medication in question since April, and she has missed appointments. She also has been sending messages stating she is still taking tyroid medication. Trying to figure out if she has another pharmacy .Tried reaching out to her son but I got a voicemail so I did leave a message to call us back.

## 2024-10-01 NOTE — PROGRESS NOTES
Ambulatory Visit  Name: Ginna Landeros      : 1960      MRN: [id number]  Encounter Provider: Mallika Smiley DO  Encounter Date: 10/2/2024   Encounter department: [facility]    Assessment & Plan  Acquired hypothyroidism  I have concerns patient is not taking her thyroid medication but she is very convincing that she is. She reports she takes 200mcg daily and took it this morning as she takes it every morning. Will increase dose, check thyroid and pituitary imaging.   Patient to bring all meds in next week for nurse visit to confirm.   Will see patient in 3-4 weeks to recheck labs.   Home health and social work referrals placed.     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  Labs reviewed: CBC, CMP, Lipid panel, TSH, T4 from 2024    History of Present Illness     Chief Complaint   Patient presents with    Follow-up      Disucss Thyriod (abnormal labs)     HPI Patient presents to discuss thyroid management. She reports her endometrial cancer surgery is postponed until her thyroid is better controlled.     History  obtained from : patient  Review of Systems   Constitutional:  Positive for appetite change and fatigue. Negative for chills and fever.   HENT:  Negative for congestion and sore throat.    Eyes:  Negative for visual disturbance.   Respiratory:  Negative for cough and shortness of breath.    Gastrointestinal:  Negative for constipation and diarrhea.   Endocrine: Negative for cold intolerance, heat intolerance, polydipsia and polyuria.   Genitourinary:  Negative for difficulty urinating, frequency and hematuria.   Musculoskeletal:  Positive for gait problem.   Skin:  Negative for rash.   Neurological:  Positive for dizziness.   Psychiatric/Behavioral:  Positive for dysphoric mood.      Current Outpatient Medications on File Prior to Visit   Medication Sig Dispense Refill    acetaminophen (TYLENOL) 325 mg tablet Take 650 mg by mouth every 4 (four) hours as needed      aspirin 81 mg chewable tablet Chew 1 tablet (81 mg total) daily 90 tablet 0    atorvastatin (LIPITOR) 80 mg tablet Take 1 tablet (80 mg total) by mouth daily 90 tablet 0    busPIRone (BUSPAR) 10 mg tablet Take 1 tablet (10 mg total) by mouth daily at bedtime 90 tablet 0    cetirizine (ZyrTEC) 10 MG chewable tablet Chew 1 tablet (10 mg total) daily 90 tablet 0    clopidogrel (PLAVIX) 75 mg tablet Take 1 tablet (75 mg total) by mouth daily 90 tablet 0    donepezil (Aricept) 10 mg tablet Take 1 tablet (10 mg total) by mouth daily at bedtime 90 tablet 0    ezetimibe (ZETIA) 10 mg tablet Take 1 tablet (10 mg total) by mouth daily 90 tablet 0    FLUoxetine (PROzac) 40 MG capsule Take 1 capsule (40 mg total) by mouth daily 90 capsule 0    latanoprost (XALATAN) 0.005 % ophthalmic solution       losartan (COZAAR) 25 mg tablet Take 1 tablet (25 mg total) by mouth daily 90 tablet 0    metoprolol succinate (TOPROL-XL) 25 mg 24 hr tablet Take 1 tablet (25 mg total) by mouth daily 90 tablet 0    omeprazole (PriLOSEC) 20 mg delayed release capsule Take 1 capsule (20 mg  total) by mouth daily 90 capsule 0    [DISCONTINUED] levothyroxine 200 mcg tablet Take 1 tablet (200 mcg total) by mouth daily 90 tablet 0     No current facility-administered medications on file prior to visit.      Social History     Tobacco Use    Smoking status: Every Day     Types: Cigarettes     Passive exposure: Past    Smokeless tobacco: Never   Vaping Use    Vaping status: Never Used   Substance and Sexual Activity    Alcohol use: Yes     Alcohol/week: 2.0 standard drinks of alcohol     Types: 2 Standard drinks or equivalent per week    Drug use: Never    Sexual activity: Not Currently         Objective     /67 (BP Location: Right arm, Patient Position: Sitting, Cuff Size: Standard)   Pulse 85   Wt (!) 144 kg (317 lb)   BMI 51.17 kg/m²     Physical Exam  Vitals reviewed.   Constitutional:       General: She is not in acute distress.     Appearance: She is obese. She is not ill-appearing.      Comments: Ambulates with walker   HENT:      Head: Normocephalic and atraumatic.   Neck:      Vascular: No carotid bruit.   Cardiovascular:      Rate and Rhythm: Normal rate.   Pulmonary:      Effort: Pulmonary effort is normal.      Breath sounds: Normal breath sounds.   Musculoskeletal:      Cervical back: Neck supple.   Neurological:      Mental Status: She is alert.   Psychiatric:         Mood and Affect: Mood normal.         Behavior: Behavior normal.         Thought Content: Thought content normal.

## 2024-10-01 NOTE — ASSESSMENT & PLAN NOTE
I have concerns patient is not taking her thyroid medication but she is very convincing that she is. She reports she takes 200mcg daily and took it this morning as she takes it every morning. Will increase dose, check thyroid and pituitary imaging.   Patient to bring all meds in next week for nurse visit to confirm.   Will see patient in 3-4 weeks to recheck labs.   Home health and social work referrals placed.     Orders:    levothyroxine 300 MCG tablet; Take 1 tablet (300 mcg total) by mouth daily in the early morning    US thyroid; Future    MRI brain pituitary wo and w contrast; Future    EXTERNAL Referral to Home Health; Future

## 2024-10-02 ENCOUNTER — OFFICE VISIT (OUTPATIENT)
Dept: FAMILY MEDICINE CLINIC | Facility: CLINIC | Age: 64
End: 2024-10-02
Payer: COMMERCIAL

## 2024-10-02 ENCOUNTER — TELEPHONE (OUTPATIENT)
Age: 64
End: 2024-10-02

## 2024-10-02 VITALS
DIASTOLIC BLOOD PRESSURE: 67 MMHG | HEART RATE: 85 BPM | WEIGHT: 293 LBS | BODY MASS INDEX: 51.17 KG/M2 | SYSTOLIC BLOOD PRESSURE: 144 MMHG

## 2024-10-02 DIAGNOSIS — R26.2 AMBULATORY DYSFUNCTION: ICD-10-CM

## 2024-10-02 DIAGNOSIS — E03.9 ACQUIRED HYPOTHYROIDISM: Primary | ICD-10-CM

## 2024-10-02 DIAGNOSIS — Z59.82 TRANSPORTATION INSECURITY: ICD-10-CM

## 2024-10-02 DIAGNOSIS — I69.30 HISTORY OF CVA WITH RESIDUAL DEFICIT: ICD-10-CM

## 2024-10-02 DIAGNOSIS — F01.B3 MODERATE VASCULAR DEMENTIA WITH MOOD DISTURBANCE (HCC): ICD-10-CM

## 2024-10-02 PROCEDURE — 99214 OFFICE O/P EST MOD 30 MIN: CPT | Performed by: FAMILY MEDICINE

## 2024-10-02 PROCEDURE — G2211 COMPLEX E/M VISIT ADD ON: HCPCS | Performed by: FAMILY MEDICINE

## 2024-10-02 RX ORDER — LEVOTHYROXINE SODIUM 300 UG/1
300 TABLET ORAL
Qty: 100 TABLET | Refills: 0 | Status: SHIPPED | OUTPATIENT
Start: 2024-10-02

## 2024-10-02 SDOH — ECONOMIC STABILITY - TRANSPORTATION SECURITY: TRANSPORTATION INSECURITY: Z59.82

## 2024-10-02 NOTE — TELEPHONE ENCOUNTER
Tamra with [facility] received a referral from Dr. Smiley for home health therapy for gate dysfunction PT OT. [facility] accepted this patient and will be reaching out to the patient shortly.

## 2024-10-02 NOTE — ASSESSMENT & PLAN NOTE
Orders:    EXTERNAL Referral to Home Health; Future    Ambulatory Referral to Social Work Care Management Program; Future

## 2024-10-03 ENCOUNTER — PATIENT OUTREACH (OUTPATIENT)
Dept: CASE MANAGEMENT | Facility: OTHER | Age: 64
End: 2024-10-03

## 2024-10-03 NOTE — PROGRESS NOTES
OPCM SW received another referral for patient for possible resources.  Per chart review, UTR letter was sent 9/18.  OPCM SW reached out to patient and left a voicemail.  Patient is noted to have Vascular dementia.  OPCM SW also reached out to friend-Yolanda and son-David and left messages to both.    Per chart review, [facility] recently received a referral and will be caring for patient's [facility] needs as well. If no return call received by next week, will f/u again

## 2024-10-04 ENCOUNTER — TELEPHONE (OUTPATIENT)
Dept: FAMILY MEDICINE CLINIC | Facility: CLINIC | Age: 64
End: 2024-10-04

## 2024-10-04 ENCOUNTER — TELEPHONE (OUTPATIENT)
Age: 64
End: 2024-10-04

## 2024-10-04 NOTE — TELEPHONE ENCOUNTER
Called [facility] in regards to Ginna and they said they would get back to her some time today. Requested maybe a psych dr in regards to her sister passing.

## 2024-10-04 NOTE — TELEPHONE ENCOUNTER
Spoke to Ginna in regards to missed appointment and [facility] was trying to get ahold of her for 3 days. Ginna said her sister committed suicide the day of her dr appointment. She found out when she got home from her other sister. She wants time to mourn I will give her a call next week to schedule a follow up to go over her meds and reach out to [facility] to have them call her.

## 2024-10-04 NOTE — TELEPHONE ENCOUNTER
[facility] health has been trying to reach the patient for the past 3 days with no luck.  They wanted to let he PCP know.

## 2024-10-08 ENCOUNTER — TELEPHONE (OUTPATIENT)
Age: 64
End: 2024-10-08

## 2024-10-08 NOTE — TELEPHONE ENCOUNTER
Blake from [facility] relayed that they will be starting home health care with patient tomorrow. Wanted to confirm pcp Dr. Smiley, as they will be sending over orders once home care is initiated. Confirmed fax number. Nothing else needed at this time.

## 2024-10-09 ENCOUNTER — PATIENT OUTREACH (OUTPATIENT)
Dept: CASE MANAGEMENT | Facility: OTHER | Age: 64
End: 2024-10-09

## 2024-10-09 DIAGNOSIS — Z74.2 CAREGIVER NOT READILY AVAILABLE: Primary | ICD-10-CM

## 2024-10-09 NOTE — TELEPHONE ENCOUNTER
Called and spoke to Ginna to schedule a follow up appointment we have her scheduled for the 17 of Oct

## 2024-10-09 NOTE — PROGRESS NOTES
OPCM SW attempted second outreach to patient.  Patient reports that her phone dies a lot, she tries to keep it charged, but that that is why she can sometimes be hard to reach.  Per our discussion, she does meet the income limits for waiver program.  She lives alone with no support and would like CMOC assistance with this process as she was recently diagnosed with Cancer and does have some cognitive issues with Vascular Dementia per chart review.    [facility] is also involved and patient will be getting PT/OT and an MSW starting there as well.  The RN was able to come out today.  Patient appreciative of any and all help available to her at this time.    CMOC referral placed.  Plan for f/u within 2 weeks

## 2024-10-09 NOTE — TELEPHONE ENCOUNTER
Chey from [facility] called to advise they saw pt today and pt will be receiving weekly nurse visits. She stated PT, OT and  will also be meeting with patient to evaluate how often services will be needed.

## 2024-10-10 ENCOUNTER — TELEPHONE (OUTPATIENT)
Age: 64
End: 2024-10-10

## 2024-10-10 NOTE — TELEPHONE ENCOUNTER
Sinai the RN from [facility] called to give a clinical update. She completed an OT evaluation and she will be visited at the home once a week for 4 weeks. The physical therapist will be going to the patient's home tomorrow for an evaluation. She is concerned because the patient lives alone and has Dementia. The patient had a blood pressure of 174/116 and she told Sinai she didn't take her high blood pressure medication and told her she only took her thyroid medication. Sinai stated it was hard to talk to her and get a straight answer. The patient is also having a hard time standing up and walking.

## 2024-10-10 NOTE — TELEPHONE ENCOUNTER
Sinai called the office asking to speak to dr. Griffin regarding patient. Call transferred over to Radha in the office to further assistance

## 2024-10-11 NOTE — TELEPHONE ENCOUNTER
I spoke with Sinai. She has many similar concerns to myself that patient is not compliant with taking her medications and would likely benefit from higher level of care as she is not doing well caring for herself. We have not had any return calls from family members for assistance. Sinai  notified me that a  from [facility] would be seeing the patient next week and I provided her my number to call me with updates. I am happy to assist with any paperwork needed to get this patient the proper care she needs.     Dr. Smiley

## 2024-10-14 ENCOUNTER — PATIENT OUTREACH (OUTPATIENT)
Dept: CASE MANAGEMENT | Facility: OTHER | Age: 64
End: 2024-10-14

## 2024-10-14 NOTE — PROGRESS NOTES
CMPAULA received a referral from  DUANE Mckeon to assist patient with applying for waiver services.     [facility] contacted Ginna to discuss the referral. She's agreeable to services but would like to have her son, David, present to schedule the appointment as she would like him to be able to attend. David works nights so he sleeps in the mornings. [facility] agreed and call was ended.    [facility] attempted to contact David to discuss. No answer. Left message on voicemail with reason for call, this writer's contact information, and a request for a call back to discuss.     Will outreach next week if no contact prior.

## 2024-10-17 ENCOUNTER — OFFICE VISIT (OUTPATIENT)
Dept: FAMILY MEDICINE CLINIC | Facility: CLINIC | Age: 64
End: 2024-10-17
Payer: COMMERCIAL

## 2024-10-17 ENCOUNTER — TELEPHONE (OUTPATIENT)
Age: 64
End: 2024-10-17

## 2024-10-17 VITALS
HEIGHT: 66 IN | DIASTOLIC BLOOD PRESSURE: 67 MMHG | SYSTOLIC BLOOD PRESSURE: 130 MMHG | WEIGHT: 293 LBS | BODY MASS INDEX: 47.09 KG/M2

## 2024-10-17 DIAGNOSIS — E03.9 ACQUIRED HYPOTHYROIDISM: Primary | ICD-10-CM

## 2024-10-17 DIAGNOSIS — I69.30 HISTORY OF CVA WITH RESIDUAL DEFICIT: ICD-10-CM

## 2024-10-17 DIAGNOSIS — F01.B3 MODERATE VASCULAR DEMENTIA WITH MOOD DISTURBANCE (HCC): ICD-10-CM

## 2024-10-17 DIAGNOSIS — F43.21 ADJUSTMENT DISORDER WITH DEPRESSED MOOD: ICD-10-CM

## 2024-10-17 PROCEDURE — G2211 COMPLEX E/M VISIT ADD ON: HCPCS | Performed by: FAMILY MEDICINE

## 2024-10-17 PROCEDURE — 99215 OFFICE O/P EST HI 40 MIN: CPT | Performed by: FAMILY MEDICINE

## 2024-10-17 NOTE — TELEPHONE ENCOUNTER
Call from DUANE Gutierrez from [facility] to provide update. She visited with patient yesterday and did not have any real concerns. Patient stated she was having some issues with her family and did not like where she was living but is working with Housing to find other living arrangements. Patient has all her transportation needs met. Brenda stated she will not be visiting patient again.

## 2024-10-17 NOTE — PROGRESS NOTES
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    I have spent a total time of 45 minutes on 10/17/2024 in caring for this patient including Risks and benefits of tx options, Instructions for management, Patient and family education, Importance of tx compliance, Counseling / Coordination of care, Documenting in the medical record, Reviewing / ordering tests, medicine, procedures  , Obtaining or reviewing history  , and Communicating with other healthcare professionals .      History of Present Illness     Chief Complaint   Patient presents with    Follow-up     HPI    Patient presented with her son to discuss her current health situation and ongoing concerns about patient's ability to continue living alone and caring for herself. Currently we have successfully gotten her set up with [facility] for home health services including PT/OT, home nursing and social work. We are working to get her connected to resources in the community to continue aiding the patient. At this time it seems she is more consistently compliant with taking her medications on a regular basis. She takes her thyroid pill in the morning and the rest of her pills in the evening. She is tolerating them well and he blood pressure seems to be improving!     We had a long discussion with her and her son about long-term considering nursing home facility level of care as we will not be able to have the assistance of home health services forever and she may continue to need guidance with taking medications  and prevent risk for falls. We will continue to see her regularly to monitor her mobility and cognitive status.     She reports at this time I am only to share medical information with her or her son David.     She is sadly struggling with depressive symptoms since the loss of her sister this month and just finding out as well today that her other sister is not doing well either.     She is also worried about the ongoing vaginal bleeding which we know if from the suspected endometrial cancer. Unfortunately we need to get her thyroid better controlled before they will surgically intervene. She has an appt with me in two weeks to recheck her thyroid.     I also called [facility] for updates. They will be sending someone out to assess her mood. They will also be working to get her community services.     History obtained from : patient  Review of Systems   Constitutional:  Positive for appetite change and fatigue. Negative for chills and fever.   HENT:  Negative for congestion and sore throat.    Eyes:  Negative for visual disturbance.   Respiratory:  Negative for cough and shortness of breath.    Gastrointestinal:  Negative for constipation and diarrhea.   Endocrine: Negative for cold intolerance, heat intolerance, polydipsia and polyuria.   Genitourinary:  Positive for vaginal bleeding. Negative for difficulty urinating, frequency and hematuria.   Musculoskeletal:  Positive for gait problem.   Skin:  Negative for rash.   Neurological:  Positive for dizziness.   Psychiatric/Behavioral:  Positive for dysphoric mood.      Current Outpatient Medications on File Prior to Visit   Medication Sig Dispense Refill    aspirin 81 mg chewable tablet Chew 1 tablet (81 mg total) daily 90 tablet 0    atorvastatin (LIPITOR) 80 mg tablet Take 1 tablet (80 mg total) by mouth daily 90 tablet 0    busPIRone (BUSPAR) 10 mg tablet Take 1 tablet (10 mg total) by mouth daily at bedtime 90 tablet 0    clopidogrel (PLAVIX) 75 mg tablet Take 1  "tablet (75 mg total) by mouth daily 90 tablet 0    donepezil (Aricept) 10 mg tablet Take 1 tablet (10 mg total) by mouth daily at bedtime 90 tablet 0    ezetimibe (ZETIA) 10 mg tablet Take 1 tablet (10 mg total) by mouth daily 90 tablet 0    FLUoxetine (PROzac) 40 MG capsule Take 1 capsule (40 mg total) by mouth daily 90 capsule 0    latanoprost (XALATAN) 0.005 % ophthalmic solution       levothyroxine 300 MCG tablet Take 1 tablet (300 mcg total) by mouth daily in the early morning 100 tablet 0    losartan (COZAAR) 25 mg tablet Take 1 tablet (25 mg total) by mouth daily 90 tablet 0    metoprolol succinate (TOPROL-XL) 25 mg 24 hr tablet Take 1 tablet (25 mg total) by mouth daily 90 tablet 0    omeprazole (PriLOSEC) 20 mg delayed release capsule Take 1 capsule (20 mg total) by mouth daily 90 capsule 0     No current facility-administered medications on file prior to visit.      Social History     Tobacco Use    Smoking status: Every Day     Types: Cigarettes     Passive exposure: Past    Smokeless tobacco: Never   Vaping Use    Vaping status: Never Used   Substance and Sexual Activity    Alcohol use: Yes     Alcohol/week: 2.0 standard drinks of alcohol     Types: 2 Standard drinks or equivalent per week    Drug use: Never    Sexual activity: Not Currently         Objective     /67 (BP Location: Left arm, Patient Position: Sitting, Cuff Size: Large)   Ht 5' 6\" (1.676 m)   Wt (!) 144 kg (317 lb)   BMI 51.17 kg/m²     Physical Exam  Vitals reviewed.   Constitutional:       Appearance: She is obese. She is ill-appearing (chronically).      Comments: Wheel chair   HENT:      Head: Normocephalic and atraumatic.   Cardiovascular:      Rate and Rhythm: Normal rate.   Pulmonary:      Effort: Pulmonary effort is normal.   Neurological:      Mental Status: She is alert. Mental status is at baseline.   Psychiatric:         Attention and Perception: Attention normal.         Mood and Affect: Mood is depressed. Affect is " tearful.

## 2024-10-18 PROBLEM — F43.21 ADJUSTMENT DISORDER WITH DEPRESSED MOOD: Status: ACTIVE | Noted: 2024-10-18

## 2024-10-21 ENCOUNTER — PATIENT OUTREACH (OUTPATIENT)
Dept: CASE MANAGEMENT | Facility: OTHER | Age: 64
End: 2024-10-21

## 2024-10-21 ENCOUNTER — TELEPHONE (OUTPATIENT)
Age: 64
End: 2024-10-21

## 2024-10-21 NOTE — TELEPHONE ENCOUNTER
Kayli -Behavioral Health RN, calls from [facility] . Kayli was referred to the patient. She is asking for a verbal Ok that she may also treat the patient. Kayli would like to do some teaching to assist the patient. Kayli can be reached at [phone]. RN stated that it is OK to leave a message on her phone.

## 2024-10-21 NOTE — TELEPHONE ENCOUNTER
Attempted to reach Kayli from Behavioral health to let her know that Dr. Smiley gave her the OK to treat the patient. Kayli didn't answer/ left a voicemail.

## 2024-10-23 NOTE — PROGRESS NOTES
CMOC contacted Ginna to discuss applying for waiver services.     Ginna requests a call to her son to assist with scheduling the [facility] intake appointment as she would like him present for the visit.   CMOC contacted her son, David, to discuss. He disclosed he likely won't be available for the intake appointment d/t his work schedule/sleep schedule and would prefer mom to schedule the appointment and relay any information or needs to him.   CMOC relayed this to Ginna and she is agreeable to scheduling her intake appointment.     A referral for waiver services was entered via [facility] website. This writer and Ginna's son, David, were listed as authorized representatives and three dates were chosen for the intake appointment. Ginna will be contacted to confirm a date and complete the appointment.     No other questions or needs currently.     Will outreach in two weeks for a status update and to assist as needed.

## 2024-10-24 ENCOUNTER — TELEPHONE (OUTPATIENT)
Dept: FAMILY MEDICINE CLINIC | Facility: CLINIC | Age: 64
End: 2024-10-24

## 2024-10-24 NOTE — TELEPHONE ENCOUNTER
----- Message from Mallika Smiley DO sent at 10/18/2024  1:51 PM EDT -----  Regarding: assist with scheduling  Please reach out to patient to assist with scheduling her US thyroid and MRI brain. Please let me know once scheduled.     Thank you,  Dr. Smiley

## 2024-10-24 NOTE — TELEPHONE ENCOUNTER
I left a message for Ginna to schedule her thyroid and brain scan ASAP. I called to assist her with scheduling these to appointments. I did give her the number to call for scheduling I will try reaching out to her again

## 2024-10-31 ENCOUNTER — OFFICE VISIT (OUTPATIENT)
Dept: FAMILY MEDICINE CLINIC | Facility: CLINIC | Age: 64
End: 2024-10-31
Payer: COMMERCIAL

## 2024-10-31 ENCOUNTER — APPOINTMENT (OUTPATIENT)
Dept: LAB | Facility: CLINIC | Age: 64
End: 2024-10-31
Payer: COMMERCIAL

## 2024-10-31 VITALS
BODY MASS INDEX: 52.09 KG/M2 | OXYGEN SATURATION: 98 % | HEART RATE: 65 BPM | SYSTOLIC BLOOD PRESSURE: 142 MMHG | DIASTOLIC BLOOD PRESSURE: 84 MMHG | TEMPERATURE: 66 F | WEIGHT: 293 LBS

## 2024-10-31 DIAGNOSIS — I25.10 CORONARY ARTERY DISEASE INVOLVING NATIVE CORONARY ARTERY OF NATIVE HEART WITHOUT ANGINA PECTORIS: ICD-10-CM

## 2024-10-31 DIAGNOSIS — H66.002 NON-RECURRENT ACUTE SUPPURATIVE OTITIS MEDIA OF LEFT EAR WITHOUT SPONTANEOUS RUPTURE OF TYMPANIC MEMBRANE: ICD-10-CM

## 2024-10-31 DIAGNOSIS — E03.9 ACQUIRED HYPOTHYROIDISM: Primary | ICD-10-CM

## 2024-10-31 LAB
ALBUMIN SERPL BCG-MCNC: 3.9 G/DL (ref 3.5–5)
ALP SERPL-CCNC: 129 U/L (ref 34–104)
ALT SERPL W P-5'-P-CCNC: 13 U/L (ref 7–52)
ANION GAP SERPL CALCULATED.3IONS-SCNC: 7 MMOL/L (ref 4–13)
AST SERPL W P-5'-P-CCNC: 15 U/L (ref 13–39)
BASOPHILS # BLD AUTO: 0.05 THOUSANDS/ΜL (ref 0–0.1)
BASOPHILS NFR BLD AUTO: 1 % (ref 0–1)
BILIRUB SERPL-MCNC: 0.52 MG/DL (ref 0.2–1)
BUN SERPL-MCNC: 26 MG/DL (ref 5–25)
CALCIUM SERPL-MCNC: 9.8 MG/DL (ref 8.4–10.2)
CHLORIDE SERPL-SCNC: 105 MMOL/L (ref 96–108)
CHOLEST SERPL-MCNC: 75 MG/DL
CO2 SERPL-SCNC: 28 MMOL/L (ref 21–32)
CREAT SERPL-MCNC: 0.91 MG/DL (ref 0.6–1.3)
EOSINOPHIL # BLD AUTO: 0.25 THOUSAND/ΜL (ref 0–0.61)
EOSINOPHIL NFR BLD AUTO: 4 % (ref 0–6)
ERYTHROCYTE [DISTWIDTH] IN BLOOD BY AUTOMATED COUNT: 14.2 % (ref 11.6–15.1)
GFR SERPL CREATININE-BSD FRML MDRD: 65 ML/MIN/1.73SQ M
GLUCOSE P FAST SERPL-MCNC: 101 MG/DL (ref 65–99)
HCT VFR BLD AUTO: 36.5 % (ref 34.8–46.1)
HDLC SERPL-MCNC: 33 MG/DL
HGB BLD-MCNC: 11.4 G/DL (ref 11.5–15.4)
IMM GRANULOCYTES # BLD AUTO: 0.04 THOUSAND/UL (ref 0–0.2)
IMM GRANULOCYTES NFR BLD AUTO: 1 % (ref 0–2)
LDLC SERPL CALC-MCNC: 29 MG/DL (ref 0–100)
LYMPHOCYTES # BLD AUTO: 1.6 THOUSANDS/ΜL (ref 0.6–4.47)
LYMPHOCYTES NFR BLD AUTO: 28 % (ref 14–44)
MCH RBC QN AUTO: 28.3 PG (ref 26.8–34.3)
MCHC RBC AUTO-ENTMCNC: 31.2 G/DL (ref 31.4–37.4)
MCV RBC AUTO: 91 FL (ref 82–98)
MONOCYTES # BLD AUTO: 0.54 THOUSAND/ΜL (ref 0.17–1.22)
MONOCYTES NFR BLD AUTO: 10 % (ref 4–12)
NEUTROPHILS # BLD AUTO: 3.18 THOUSANDS/ΜL (ref 1.85–7.62)
NEUTS SEG NFR BLD AUTO: 56 % (ref 43–75)
NONHDLC SERPL-MCNC: 42 MG/DL
NRBC BLD AUTO-RTO: 0 /100 WBCS
PLATELET # BLD AUTO: 290 THOUSANDS/UL (ref 149–390)
PMV BLD AUTO: 10.8 FL (ref 8.9–12.7)
POTASSIUM SERPL-SCNC: 3.8 MMOL/L (ref 3.5–5.3)
PROT SERPL-MCNC: 6.9 G/DL (ref 6.4–8.4)
RBC # BLD AUTO: 4.03 MILLION/UL (ref 3.81–5.12)
SODIUM SERPL-SCNC: 140 MMOL/L (ref 135–147)
T4 FREE SERPL-MCNC: 3.07 NG/DL (ref 0.61–1.12)
TRIGL SERPL-MCNC: 65 MG/DL
TSH SERPL DL<=0.05 MIU/L-ACNC: 0.14 UIU/ML (ref 0.45–4.5)
WBC # BLD AUTO: 5.66 THOUSAND/UL (ref 4.31–10.16)

## 2024-10-31 PROCEDURE — 99214 OFFICE O/P EST MOD 30 MIN: CPT | Performed by: FAMILY MEDICINE

## 2024-10-31 PROCEDURE — G2211 COMPLEX E/M VISIT ADD ON: HCPCS | Performed by: FAMILY MEDICINE

## 2024-10-31 PROCEDURE — 84439 ASSAY OF FREE THYROXINE: CPT | Performed by: FAMILY MEDICINE

## 2024-10-31 RX ORDER — CEFDINIR 300 MG/1
300 CAPSULE ORAL EVERY 12 HOURS SCHEDULED
Qty: 14 CAPSULE | Refills: 0 | Status: SHIPPED | OUTPATIENT
Start: 2024-10-31 | End: 2024-11-07

## 2024-10-31 NOTE — PROGRESS NOTES
Ambulatory Visit  Name: Ginna Landeros      : 1960      MRN: [id number]  Encounter Provider: Mallika Smiley DO  Encounter Date: 10/31/2024   Encounter department: [facility] PRIMARY CARE    Assessment & Plan  Acquired hypothyroidism  Recent TSH and T4 very abnormal  Levothyroxine was increased from 200 to 300mcg daily about a month ago  We will recheck today, she also has US thyroid and MRI pituitary scheduled for 11/15 - f/u results   Symptomatically and visually on exam patient is improving!   She appears with more energy, less depressed, and more cognitively sound.   F/u in December as scheduled or sooner if needed     Orders:    TSH, 3rd generation; Future    T4, free; Future    T4, free    Coronary artery disease involving native coronary artery of native heart without angina pectoris    Orders:    CBC and differential; Future    Basic metabolic panel; Future    Non-recurrent acute suppurative otitis media of left ear without spontaneous rupture of tympanic membrane    Orders:    cefdinir (OMNICEF) 300 mg capsule; Take 1 capsule (300 mg total) by mouth every 12 (twelve) hours for 7 days      Return for Next scheduled follow up.    History of Present Illness     Chief Complaint   Patient presents with    Follow-up     Recheck chronic conditions and meds     Patient presents for follow up of abnormal thyroid function. Symptomatically she seems to be improving. We are trying to get her thyroid under control so she can undergo a needed hysterectomy for suspected endometrial cancer. She reports having her appetite back. Her depression is improving and she has more energy. She has home health services coming with an Aide twice a week and  PT/OT.     She does report left sided ear pain for one week that is not getting better.     HPI    History obtained from : patient  Review of Systems   Constitutional:  Positive for fatigue (improving) and unexpected weight change. Negative for  appetite change (got her appetite back!).   Endocrine: Negative for cold intolerance and heat intolerance.   Musculoskeletal:  Positive for gait problem.   Psychiatric/Behavioral:  Positive for dysphoric mood (improving) and sleep disturbance.      Current Outpatient Medications on File Prior to Visit   Medication Sig Dispense Refill    aspirin 81 mg chewable tablet Chew 1 tablet (81 mg total) daily 90 tablet 0    atorvastatin (LIPITOR) 80 mg tablet Take 1 tablet (80 mg total) by mouth daily 90 tablet 0    busPIRone (BUSPAR) 10 mg tablet Take 1 tablet (10 mg total) by mouth daily at bedtime 90 tablet 0    clopidogrel (PLAVIX) 75 mg tablet Take 1 tablet (75 mg total) by mouth daily 90 tablet 0    donepezil (Aricept) 10 mg tablet Take 1 tablet (10 mg total) by mouth daily at bedtime 90 tablet 0    ezetimibe (ZETIA) 10 mg tablet Take 1 tablet (10 mg total) by mouth daily 90 tablet 0    FLUoxetine (PROzac) 40 MG capsule Take 1 capsule (40 mg total) by mouth daily 90 capsule 0    latanoprost (XALATAN) 0.005 % ophthalmic solution       levothyroxine 300 MCG tablet Take 1 tablet (300 mcg total) by mouth daily in the early morning 100 tablet 0    losartan (COZAAR) 25 mg tablet Take 1 tablet (25 mg total) by mouth daily 90 tablet 0    metoprolol succinate (TOPROL-XL) 25 mg 24 hr tablet Take 1 tablet (25 mg total) by mouth daily 90 tablet 0    omeprazole (PriLOSEC) 20 mg delayed release capsule Take 1 capsule (20 mg total) by mouth daily 90 capsule 0     No current facility-administered medications on file prior to visit.      Social History     Tobacco Use    Smoking status: Every Day     Types: Cigarettes     Passive exposure: Past    Smokeless tobacco: Never   Vaping Use    Vaping status: Never Used   Substance and Sexual Activity    Alcohol use: Yes     Alcohol/week: 2.0 standard drinks of alcohol     Types: 2 Standard drinks or equivalent per week    Drug use: Never    Sexual activity: Not Currently         Objective      /84 (BP Location: Left arm, Patient Position: Sitting, Cuff Size: Large)   Pulse 65   Temp (!) 66 °F (18.9 °C)   Wt (!) 146 kg (322 lb 12.1 oz)   SpO2 98%   BMI 52.09 kg/m²     Physical Exam  Vitals reviewed.   Constitutional:       Appearance: She is obese. She is not ill-appearing.      Comments: walker   HENT:      Head: Normocephalic and atraumatic.      Right Ear: Tympanic membrane, ear canal and external ear normal. There is no impacted cerumen.      Left Ear: Ear canal and external ear normal. There is no impacted cerumen. Tympanic membrane is injected, scarred and retracted.   Cardiovascular:      Rate and Rhythm: Normal rate and regular rhythm.   Pulmonary:      Effort: Pulmonary effort is normal.      Breath sounds: Normal breath sounds.   Neurological:      Mental Status: She is alert. Mental status is at baseline.   Psychiatric:         Attention and Perception: Attention normal.         Mood and Affect: Mood and affect normal. Mood is not depressed.

## 2024-11-01 ENCOUNTER — TELEPHONE (OUTPATIENT)
Dept: FAMILY MEDICINE CLINIC | Facility: CLINIC | Age: 64
End: 2024-11-01

## 2024-11-01 DIAGNOSIS — E03.9 ACQUIRED HYPOTHYROIDISM: ICD-10-CM

## 2024-11-01 DIAGNOSIS — E03.9 ACQUIRED HYPOTHYROIDISM: Primary | ICD-10-CM

## 2024-11-01 RX ORDER — LEVOTHYROXINE SODIUM 200 UG/1
200 TABLET ORAL
Qty: 30 TABLET | Refills: 0 | Status: SHIPPED | OUTPATIENT
Start: 2024-11-01

## 2024-11-01 NOTE — TELEPHONE ENCOUNTER
----- Message from Mallika Smiley DO sent at 11/1/2024  7:38 AM EDT -----  Please call patient. Let her know her thyroid is now over treated and we need to go back down to the previous levothyroxine dose. I know she had some leftover when I saw her last. Have her STOP 300mcg daily and start back on 200mcg daily. We will recheck labs in 4 weeks. I will place all the orders now.     Please let me know if she has any questions.

## 2024-11-01 NOTE — TELEPHONE ENCOUNTER
Tried to call Christina this morning but the phone number on file says that its no longer in service. I left a VM with the alternate  to see if she can get ahold of Christina to have her call the office to give her some of her test results and some information from

## 2024-11-01 NOTE — TELEPHONE ENCOUNTER
Patient called back.  I relayed the message and she will go back to the 200 mcg. She verbally understood.  She will check her number as well.

## 2024-11-01 NOTE — TELEPHONE ENCOUNTER
Left a VM with her son Eric and Notified him that his mothers phone was not in service and the results of her thyroid that she needs to take 200mg instead of 300mg and to have her recheck her levels in 4 weeks Labs were put in. Also to give the office a call if he has any further questions or concerns

## 2024-11-04 ENCOUNTER — PATIENT OUTREACH (OUTPATIENT)
Dept: CASE MANAGEMENT | Facility: OTHER | Age: 64
End: 2024-11-04

## 2024-11-04 NOTE — PROGRESS NOTES
OC contacted Ginna to discuss her waiver application process.     Ginna was unsure if she completed her intake appointment with KAYLA so a 3-way call was offered for a status check and she is agreeable.   Per [facility] representative, intake appointment was completed on 10/23 and the referral was sent to the local office on aging for a functional assessment. Call ended.     Ginna denies any correspondence from [facility]. She states her son told her someone named Cecily called but she doesn't have their number.   CMOC contacted [facility] and left a message with Ginna's contact information and requesting a call back to her to discuss and schedule the functional assessment.     Will outreach next week for a status update and to assist as needed.

## 2024-11-12 ENCOUNTER — PATIENT OUTREACH (OUTPATIENT)
Dept: CASE MANAGEMENT | Facility: OTHER | Age: 64
End: 2024-11-12

## 2024-11-12 NOTE — PROGRESS NOTES
CMOC attempted to contact Ginna for a status update on her waiver services referral.    No answer. Unable to leave a message as voicemail box is full.     Attempted to contact Ginna's son, David, for the above.   No answer. Left message on voicemail with reason for call, this writer's contact information, and a request for a call back to discuss.     CMOC contacted [facility] and s/w representative Catrina.   Both intake and functional assessment are completed and waiver referral has been sent to [facility] for financial review.     Will outreach next week if no contact prior.

## 2024-11-13 ENCOUNTER — TELEPHONE (OUTPATIENT)
Age: 64
End: 2024-11-13

## 2024-11-13 NOTE — TELEPHONE ENCOUNTER
Received call from PAMELA Rice with [facility]. Patient was discharged from OT services today.  She has reached max potential with OT services.    BP today 165/67 today- asymptomatic and she hadn't taken her medication yet.

## 2024-11-19 ENCOUNTER — PATIENT OUTREACH (OUTPATIENT)
Dept: CASE MANAGEMENT | Facility: OTHER | Age: 64
End: 2024-11-19

## 2024-11-19 ENCOUNTER — TELEPHONE (OUTPATIENT)
Age: 64
End: 2024-11-19

## 2024-11-19 NOTE — PROGRESS NOTES
[facility] contacted Ginna for a status update on her waiver services application.   On last outreach it was determined she completed the intake appointment and functional assessment and the referral was sent to [facility] for the financial assessment.     Ginna denies receiving any correspondence from [facility] at time of outreach. She believes she was already approved for services.   Ginna explains she has a caregiver coming to her home currently from Wednesday through Friday for 2 hours a day.   She believes the agency is [facility] and her caregiver's name is Paz. Visits started about one month ago after speaking with a  downstairs in her apartment building. [facility] was able to discuss and confirm with Ginna that she met with someone from the office of aging. Call ended.    PAULA contacted [facility] and s/w  Yoly Holt. She explains caregiver services were initiated by their office temporarily while Ginna applies for waiver services. Once approved, services will be provided through the waiver program. Call ended.     Will contact Ginna tomorrow to further discuss waiver and collecting bank statements.

## 2024-11-19 NOTE — TELEPHONE ENCOUNTER
Patient reports she did not show up for tests she was supposed to have this week because she was not feeling well. She also called to move her 12/9/24 appointment up to 12/3/24.

## 2024-11-20 ENCOUNTER — PATIENT OUTREACH (OUTPATIENT)
Dept: CASE MANAGEMENT | Facility: OTHER | Age: 64
End: 2024-11-20

## 2024-11-20 NOTE — PROGRESS NOTES
CMOC contacted Ginna to discuss her waiver services application.   She's completed her intake appointment and functional assessment with aging and the referral was sent to [facility] for her financial assessment.   CMOC also spoke with Yoly Holt at [facility] and determined Ginna was provided with temporary caregiver services (2 hours daily/3 days weekly) until waiver is processed/approved.     Discussed the above with Ginna. She disclosed she doesn't have a bank account and receives her SS payments via [employer] debit card. She does not own property, have life insurance or any burial agreements, does not own a vehicle and does not file income taxes.   CMOC informed she will need to provide a copy of her current social security award letter as proof of income. Ginna states she has this letter and will need to locate it.     CMOC contacted Gautam [facility] to discuss what financial verification is needed for Ginna's referral. Per Mr. Gardner, their systems are down statewide and he's unable to provide any information currently. Will call back in a few days for more information.    Will outreach Ginna in one week to assist with waiver.

## 2024-11-26 ENCOUNTER — TELEPHONE (OUTPATIENT)
Age: 64
End: 2024-11-26

## 2024-11-26 NOTE — TELEPHONE ENCOUNTER
I contacted Christina and she said she is not feeling well and she just took her medications. I told her that  advised her that it would best if she went to the hospital for a more emergent issues and a work up. She stated she does not have a ride. I did call her son but I didn't get an answer I called [facility] and they are sending a message to Christina Macedo's aide to take her to the hospital.

## 2024-11-26 NOTE — TELEPHONE ENCOUNTER
Call from [facility] nurse/Hellen/ reports that sonam has a pulse of 50-52 today apical and radial. /72. She did not take her meds yet today. HR usually in the low 60's. States she felt dizzy last night. No dizziness today. Has had an increase in heart burn and states she has vomited a few times in the last week from it. Appetite poor. Yesterday only at a [place]. No bowel issues.

## 2024-12-02 ENCOUNTER — PATIENT OUTREACH (OUTPATIENT)
Dept: CASE MANAGEMENT | Facility: OTHER | Age: 64
End: 2024-12-02

## 2024-12-02 NOTE — PROGRESS NOTES
CMOC attempted to contact Ginna for a status update on waiver services.   Previously, she completed her intake appointment and functional assessment. She should receive a financial verification request from [facility] with a list of documents she needs to provide for processing.     No answer. Left voicemail with reason for call, this writer's contact information, and a request for a call back to discuss.     Will outreach next week if no contact prior.

## 2024-12-09 ENCOUNTER — PATIENT OUTREACH (OUTPATIENT)
Dept: CASE MANAGEMENT | Facility: OTHER | Age: 64
End: 2024-12-09

## 2024-12-09 NOTE — PROGRESS NOTES
CMOC attempted to contact Ginna to discuss the waiver process.     No answer. Left message on voicemail with reason for call, this writer's contact information, and a request for a call back to assist if desired.   Attempted to contact Ginna's son, David/Wayne, as well. No answer. Left message on voicemail as above.     Unable to reach letter printed and mailed to patient's home address.     Referral will be closed at this time. CMOC is available if patient reaches out or for any future needs.    No further outreaches will be made.

## 2024-12-09 NOTE — LETTER
12/09/24    Dear Ginna Landeros,    I am a Community Health Worker with Outpatient Care Management.    I have made several attempts to call you by phone.  If you would like help with the waiver services application process please contact me at [phone] so I may further assist.      Sincerely,       Yissel Romero

## 2024-12-17 ENCOUNTER — TELEPHONE (OUTPATIENT)
Dept: FAMILY MEDICINE CLINIC | Facility: CLINIC | Age: 64
End: 2024-12-17

## 2024-12-17 NOTE — TELEPHONE ENCOUNTER
Star from  warmed transferred Ginna. Patient stated that she is having trouble breathing and is asking for Dr. Avila to prescribed her an inhaler to help with the breathing, Patient is currently in [facility]urs[facility] home in [place]. Patient stated that she made the nurses aware of her Sx and just told her that her O2 levels were low. Please advise.

## 2024-12-23 ENCOUNTER — TELEPHONE (OUTPATIENT)
Age: 64
End: 2024-12-23

## 2024-12-23 NOTE — TELEPHONE ENCOUNTER
Blake from [facility] request to see if Dr. Smiley will sigh Plan of Care orders. Last office visit 10/2024.    Office fax number given to fax orders.

## 2024-12-30 ENCOUNTER — PATIENT OUTREACH (OUTPATIENT)
Dept: CASE MANAGEMENT | Facility: OTHER | Age: 64
End: 2024-12-30

## 2024-12-30 NOTE — PROGRESS NOTES
OPCM SW reviewed patient's chart.  UTR letter was sent to her home by CMOC.  Waiver process was unable to be completed due to unsuccessful outreaches.  Per chart review, patient was recently in a Nursing home in [place]. OPCM SW attempted to reach out to patient and left a VM.  OPCM SW will attempt one more outreach within 1 week

## 2025-01-02 ENCOUNTER — TELEPHONE (OUTPATIENT)
Age: 65
End: 2025-01-02

## 2025-01-02 RX ORDER — DICYCLOMINE HCL 20 MG
20 TABLET ORAL 4 TIMES DAILY PRN
COMMUNITY
Start: 2024-12-04 | End: 2025-12-04

## 2025-01-02 RX ORDER — MECLIZINE HYDROCHLORIDE 25 MG/1
25 TABLET ORAL 3 TIMES DAILY
COMMUNITY
Start: 2024-12-04 | End: 2025-12-04

## 2025-01-02 RX ORDER — SUCRALFATE ORAL 1 G/10ML
1 SUSPENSION ORAL
COMMUNITY
Start: 2024-12-04

## 2025-01-02 NOTE — TELEPHONE ENCOUNTER
Pulse today 58 and all other vitals were good. [facility] home care calling to report they saw her for OT today and will be continuing once a week for 5 weeks.

## 2025-01-02 NOTE — TELEPHONE ENCOUNTER
Idalia the nurse called in requesting to fax an updated medication list @ [phone] for the patient. And also she requested Dr. Tena to call back the patient to educate her to take the medication as per prescribed dosages. She also said patient is very adamant about her Omeprazole tablet taking twice day instead of 1 tablet. And she is only taking her dizziness medication in noon and bedtime instead of every 8 hours as she is fine with it. Kindly help on the above. Thanks

## 2025-01-02 NOTE — TELEPHONE ENCOUNTER
Blake from [facility] called to make PCP aware they will be seeing patient once a week for 7 weeks.

## 2025-01-08 ENCOUNTER — TELEPHONE (OUTPATIENT)
Age: 65
End: 2025-01-08

## 2025-01-08 ENCOUNTER — PATIENT OUTREACH (OUTPATIENT)
Dept: CASE MANAGEMENT | Facility: OTHER | Age: 65
End: 2025-01-08

## 2025-01-08 NOTE — PROGRESS NOTES
SW and CMOC have had several unsuccessful outreaches to patient.  UTR letter sent.  Per chart review, [facility] is requesting MSW be added to the C order to further assist in the home.  OPCM SW will close referral at this time.

## 2025-01-08 NOTE — TELEPHONE ENCOUNTER
Call from [place]/ OT/ [facility] calling to report that she saw patient this am and patient was having 8/10 back pain. She would also like a  consult to help patient get a hospital bed and a [facility] aide.

## 2025-01-08 NOTE — TELEPHONE ENCOUNTER
Spoke with Ginna and related the message per Austin. Patient understood and will be coming to her scheduled appointment on Tuesday of next week.

## 2025-01-13 ENCOUNTER — TELEPHONE (OUTPATIENT)
Dept: FAMILY MEDICINE CLINIC | Facility: CLINIC | Age: 65
End: 2025-01-13

## 2025-01-13 NOTE — TELEPHONE ENCOUNTER
Tried to call pt to make her aware that her appt tomorrow will need to be rescheduled, but the number we have on file is disconnected.

## 2025-01-15 ENCOUNTER — TELEPHONE (OUTPATIENT)
Age: 65
End: 2025-01-15

## 2025-01-15 NOTE — TELEPHONE ENCOUNTER
OT called to advise that she was seen today and has been very anxious and BP was 224/77. Complaining of dizziness and is refusing ER and took medication after and was advised to relax after. Please advise

## 2025-01-17 ENCOUNTER — TELEPHONE (OUTPATIENT)
Age: 65
End: 2025-01-17

## 2025-01-17 ENCOUNTER — NURSE TRIAGE (OUTPATIENT)
Age: 65
End: 2025-01-17

## 2025-01-17 NOTE — TELEPHONE ENCOUNTER
"Anjali [phone] [facility] calling stated they are at her home for nurse visit now. Patient Did not take any of her medications today due to having abdominal pain \"took a Blue pill\" \"not sure which one. Anjali asked if it was her Rx: Bentyl, which Ginna stated yes. Her BP reading now are at 180/80,  pulse 47 and her  O2 97%. Stated she had a fall today, slid from her bed EMT was at her home today Declined ED     Please review as soon as possible  Thank you  "

## 2025-01-17 NOTE — TELEPHONE ENCOUNTER
Patient called back to reschedule her 1/17/25 visit for  next week but state she will call back she is currently tied up.

## 2025-01-17 NOTE — TELEPHONE ENCOUNTER
"Regarding: fall injury Elevated BP Missed medciaitons today  ----- Message from Esteban POND sent at 1/17/2025  3:35 PM EST -----  Anjali [phone] [facility] calling stated they are at her home for nurse visit now. Patient Did not take any of her medications today due to having abdominal pain \"took a Blue pill\" \"not sure which one. Anjali asked if it was her Rx: Bentyl, which Ginna stated yes. Her BP reading now are at 180/80,  pulse 47 and her  O2 97%. Stated she had a fall today, slid from her bed EMT was at her home today Declined ED     Please review as soon as possible  Thank you    "

## 2025-01-17 NOTE — TELEPHONE ENCOUNTER
"TC to home health nurse Anjali to triage symptoms.  Patient had a fall out of bed prior to home health RN arriving to the home.  EMS was called to assist patient off the floor, she refused ER evaluation.  Patient fell onto her buttocks, denies any injury.  Home health initially found BP to be 180/80, HR 47. Upon recheck /80 HR 50.  Patient has not taken any of her medications today besides Bentyl. She continues to refuse ER eval for home health RN.      Reason for Disposition   Recent fall and no injury    Answer Assessment - Initial Assessment Questions  1. MECHANISM: \"How did the fall happen?\"      Patient fell out of bed onto her buttocks  2. DOMESTIC VIOLENCE AND ELDER ABUSE SCREENING: \"Did you fall because someone pushed you or tried to hurt you?\" If Yes, ask: \"Are you safe now?\"      Denies  3. ONSET: \"When did the fall happen?\" (e.g., minutes, hours, or days ago)      This afternoon, approx 2 hrs ago  4. LOCATION: \"What part of the body hit the ground?\" (e.g., back, buttocks, head, hips, knees, hands, head, stomach)      buttocks  5. INJURY: \"Did you hurt (injure) yourself when you fell?\" If Yes, ask: \"What did you injure? Tell me more about this?\" (e.g., body area; type of injury; pain severity)\"      Patient denies injury but does have complaints of back pain  6. PAIN: \"Is there any pain?\" If Yes, ask: \"How bad is the pain?\" (e.g., Scale 0-10; or none, mild,       Mild back pain  7. SIZE: For cuts, bruises, or swelling, ask: \"How large is it?\" (e.g., inches or centimeters)       N/A  8. PREGNANCY: \"Is there any chance you are pregnant?\" \"When was your last menstrual period?\"      N/A  9. OTHER SYMPTOMS: \"Do you have any other symptoms?\" (e.g., dizziness, fever, weakness; new-onset or worsening).       Denies  10. CAUSE: \"What do you think caused the fall (or falling)?\" (e.g., dizzy spell, tripped)        Fell out of bed    Protocols used: Falls and Falling-Adult-[place]    "

## 2025-01-21 ENCOUNTER — TELEPHONE (OUTPATIENT)
Age: 65
End: 2025-01-21

## 2025-01-21 NOTE — TELEPHONE ENCOUNTER
Rcvd call from [facility] OT. States she saw patient around noon. Her BP was 207/97, HR 51.  Patient had not taken her AM meds so she had her take them. States she was also having 9/10 abdominal pain but she refused to go to the hospital. Patient fell last week. States the SW is going to see her katarina b/c she is not safe at home alone. OT said that patient does not take her meds on her won. Please f/u with patient.

## 2025-01-21 NOTE — TELEPHONE ENCOUNTER
Called [facility] care spoke to [facility] in regards to patient wellbeing and she said that the  just started going in home and visiting with Christina. She is due to go and visit with her tomorrow. I did ask if there was any thought to placement and Sinai stated that she has been saying that from the start. Christina is refusing to go to hospital and has fallen several times her vitals are unstable and needs 24 hr care. After the visit tomorrow the  is supposed to update in regards to placement and her thoughts to her well being. Sinai did state that she will call the  after our conversation and express to her the fact provider is concerned and needs to consider maybe placement for her well  being

## 2025-01-21 NOTE — TELEPHONE ENCOUNTER
Spoke to patient about follow up visit and scheduled her for FRI July 24. She stated she fell a couple times and she requested to be scheduled then for transportation purposes but did state that she will be late because of transportation. I am going to be calling [facility] for a follow up

## 2025-01-22 NOTE — TELEPHONE ENCOUNTER
Faxed over list of medications, It is not current beings that patient has missed a lot of appointments and has been hospitalized. Patient is due to come into the office on Friday for a ed follow up and med update

## 2025-01-24 ENCOUNTER — OFFICE VISIT (OUTPATIENT)
Dept: FAMILY MEDICINE CLINIC | Facility: CLINIC | Age: 65
End: 2025-01-24
Payer: COMMERCIAL

## 2025-01-24 ENCOUNTER — TELEPHONE (OUTPATIENT)
Age: 65
End: 2025-01-24

## 2025-01-24 VITALS
WEIGHT: 293 LBS | DIASTOLIC BLOOD PRESSURE: 79 MMHG | SYSTOLIC BLOOD PRESSURE: 186 MMHG | HEART RATE: 85 BPM | OXYGEN SATURATION: 98 % | BODY MASS INDEX: 49.1 KG/M2

## 2025-01-24 DIAGNOSIS — B37.2 CANDIDAL INTERTRIGO: ICD-10-CM

## 2025-01-24 DIAGNOSIS — I25.10 CORONARY ARTERY DISEASE INVOLVING NATIVE CORONARY ARTERY OF NATIVE HEART WITHOUT ANGINA PECTORIS: ICD-10-CM

## 2025-01-24 DIAGNOSIS — N18.32 STAGE 3B CHRONIC KIDNEY DISEASE (HCC): ICD-10-CM

## 2025-01-24 DIAGNOSIS — R26.2 AMBULATORY DYSFUNCTION: Primary | ICD-10-CM

## 2025-01-24 DIAGNOSIS — F03.A3 MILD DEMENTIA WITH MOOD DISTURBANCE, UNSPECIFIED DEMENTIA TYPE (HCC): ICD-10-CM

## 2025-01-24 DIAGNOSIS — F01.B3 MODERATE VASCULAR DEMENTIA WITH MOOD DISTURBANCE (HCC): ICD-10-CM

## 2025-01-24 DIAGNOSIS — I69.30 HISTORY OF CVA WITH RESIDUAL DEFICIT: ICD-10-CM

## 2025-01-24 DIAGNOSIS — I10 PRIMARY HYPERTENSION: ICD-10-CM

## 2025-01-24 DIAGNOSIS — E66.01 CLASS 3 SEVERE OBESITY DUE TO EXCESS CALORIES WITH SERIOUS COMORBIDITY AND BODY MASS INDEX (BMI) OF 45.0 TO 49.9 IN ADULT (HCC): ICD-10-CM

## 2025-01-24 DIAGNOSIS — E66.813 CLASS 3 SEVERE OBESITY DUE TO EXCESS CALORIES WITH SERIOUS COMORBIDITY AND BODY MASS INDEX (BMI) OF 45.0 TO 49.9 IN ADULT (HCC): ICD-10-CM

## 2025-01-24 DIAGNOSIS — F41.1 GAD (GENERALIZED ANXIETY DISORDER): ICD-10-CM

## 2025-01-24 DIAGNOSIS — E03.9 ACQUIRED HYPOTHYROIDISM: ICD-10-CM

## 2025-01-24 DIAGNOSIS — K50.919 CROHN'S DISEASE WITH COMPLICATION, UNSPECIFIED GASTROINTESTINAL TRACT LOCATION (HCC): ICD-10-CM

## 2025-01-24 PROBLEM — Z74.1 NEED FOR ASSISTANCE WITH PERSONAL CARE: Status: ACTIVE | Noted: 2024-12-04

## 2025-01-24 PROCEDURE — G2211 COMPLEX E/M VISIT ADD ON: HCPCS | Performed by: FAMILY MEDICINE

## 2025-01-24 PROCEDURE — 99214 OFFICE O/P EST MOD 30 MIN: CPT | Performed by: FAMILY MEDICINE

## 2025-01-24 RX ORDER — NYSTATIN 100000 [USP'U]/G
POWDER TOPICAL 3 TIMES DAILY
Qty: 60 G | Refills: 0 | Status: SHIPPED | OUTPATIENT
Start: 2025-01-24

## 2025-01-24 RX ORDER — LOSARTAN POTASSIUM 50 MG/1
50 TABLET ORAL DAILY
Qty: 90 TABLET | Refills: 1 | Status: SHIPPED | OUTPATIENT
Start: 2025-01-24

## 2025-01-24 RX ORDER — BUSPIRONE HYDROCHLORIDE 10 MG/1
10 TABLET ORAL 2 TIMES DAILY
Qty: 180 TABLET | Refills: 0 | Status: SHIPPED | OUTPATIENT
Start: 2025-01-24

## 2025-01-24 RX ORDER — FERROUS SULFATE 325(65) MG
325 TABLET ORAL
COMMUNITY

## 2025-01-24 RX ORDER — CETIRIZINE HYDROCHLORIDE 10 MG/1
10 TABLET ORAL DAILY
COMMUNITY

## 2025-01-24 RX ORDER — LEVOTHYROXINE SODIUM 150 UG/1
150 TABLET ORAL
Qty: 30 TABLET | Refills: 3 | Status: SHIPPED | OUTPATIENT
Start: 2025-01-24

## 2025-01-24 NOTE — ASSESSMENT & PLAN NOTE
Chronic, not well-controlled and there is a lot of concern regarding compliance with her medications  Also concerning that her most recent labs are consistent with hyperthyroidism and she still has not lowered her dose of levothyroxine, again this could be contributing to some of her high blood pressure as well, see above for management regarding this  Restart on losartan at increased dose of 50 Mg daily  Continue metoprolol XL 25 mg daily  Follow-up in 2 weeks to recheck    Orders:    CBC and differential; Future    Comprehensive metabolic panel; Future    losartan (COZAAR) 50 mg tablet; Take 1 tablet (50 mg total) by mouth daily

## 2025-01-24 NOTE — ASSESSMENT & PLAN NOTE
Was previously documented as Alzheimer's dementia, however given her age and history of CVA, MI, I believe this is vascular dementia.  We will resume her on Aricept 10 mg daily.  Continue to monitor and encourage placement in a long-term care facility given overall decline in the past year and concerns for patient's ability to care for herself at home.  There have been ongoing conversations with her home health agency regarding this.  Orders:    Vitamin B12; Future

## 2025-01-24 NOTE — ASSESSMENT & PLAN NOTE
Chronic, stable  Continue Prozac 40 mg daily  Continue BuSpar 10 mg twice daily  Orders:    busPIRone (BUSPAR) 10 mg tablet; Take 1 tablet (10 mg total) by mouth 2 (two) times a day

## 2025-01-24 NOTE — PROGRESS NOTES
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 Patient currently in wheel chair.  Her goal would be to return to walking with a cane and overall improve her ambulatory status.  She never completed any aggressive therapy following her stroke. Since recent hospital stay she has been working regularly with PT at home. Continue this. Home Health  also mentioned they are trying to help get her into long term care as this is my recommendation at this time given overall lack of improvement in ambulatory status and memory struggles this past year. She has been hospitalized multiple times and I am concerned she is not taking care of herself at home and missing medications.          Acquired hypothyroidism  Most recent TSH/T4 was consistent with supratherapeutic dosing and this was done while she was in the hospital.  They had decreased her to levothyroxine 150 mcg daily however since she has been home she reports she only had the 200 mcg and has been taking this again.  I sent for 150 mcg for her and told her she needs to start this lower dose as well as obtain blood work in the next week that we can review at her follow-up with me in 2 weeks      Orders:    TSH, 3rd generation with Free T4 reflex; Future    levothyroxine 150 mcg tablet; Take 1 tablet (150 mcg total) by mouth daily in the early morning    Primary hypertension  Chronic, not well-controlled and there is a lot of concern regarding compliance with her medications  Also concerning that her most recent labs are consistent with hyperthyroidism and she still has not lowered her dose of levothyroxine, again this could be contributing to some of her high blood pressure as well, see above for management  regarding this  Restart on losartan at increased dose of 50 Mg daily  Continue metoprolol XL 25 mg daily  Follow-up in 2 weeks to recheck    Orders:    CBC and differential; Future    Comprehensive metabolic panel; Future    losartan (COZAAR) 50 mg tablet; Take 1 tablet (50 mg total) by mouth daily    Coronary artery disease involving native coronary artery of native heart without angina pectoris  history of MI per patient, without stent placement? (History unclear)  Cardiology saw patient in hospital 6/2021 - patient with NSTEMI/Takotsubo cardiomyopathy thought to be 2/2 uncontrolled hypothyroidism. Echo at the time EF 30-35%. Patient discharged when thyroid improved and was to f/u with cardiology and have follow up echo but never did.    During her recent hospital stay she had an echocardiogram completed on 11/28/2024 that showed LVEF 50 to 54% with normal wall motion and indeterminate diastolic function.  Aortic valve was not well-visualized however there was no signs of regurgitation or stenosis.    Continue aspirin, statin, Plavix, Zetia, metoprolol    Orders:    Comprehensive metabolic panel; Future    Class 3 severe obesity due to excess calories with serious comorbidity and body mass index (BMI) of 45.0 to 49.9 in adult (HCC)           Stage 3b chronic kidney disease (HCC)  Lab Results   Component Value Date    EGFR 95 11/30/2024    EGFR 77 11/28/2024    EGFR 89 11/27/2024    CREATININE 0.68 11/30/2024    CREATININE 0.83 11/28/2024    CREATININE 0.74 11/27/2024     Most recent labs have been showing significant improvement in her renal function  We will continue to monitor this closely    Orders:    Comprehensive metabolic panel; Future    History of CVA with residual deficit  Continue aspirin, statin       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  We will  "resume her on Aricept 10 mg daily.  Continue to monitor and encourage placement in a long-term care facility given overall decline in the past year and concerns for patient's ability to care for herself at home.  There have been ongoing conversations with her home health agency regarding this.  Orders:    Vitamin B12; Future    Candidal intertrigo  Recommend nystatin powder 3 times a day until resolution  Patient to reach out if not improving  Orders:    nystatin (MYCOSTATIN) powder; Apply topically 3 (three) times a day    SEUN (generalized anxiety disorder)  Chronic, stable  Continue Prozac 40 mg daily  Continue BuSpar 10 mg twice daily  Orders:    busPIRone (BUSPAR) 10 mg tablet; Take 1 tablet (10 mg total) by mouth 2 (two) times a day    Mild dementia with mood disturbance, unspecified dementia type (HCC)    Orders:    Vitamin B12; Future    Crohn's disease with complication, unspecified gastrointestinal tract location (HCC)  Patient reports a history of Crohn's disease, not currently on any medications.  She denies any acute symptoms at this time.  Will continue to monitor           Return in about 2 weeks (around 2/7/2025) for Recheck HTN .       History of Present Illness   Chief Complaint   Patient presents with    Follow-up      follow up visit/ER VISIT       Patient was in the hospital for 1 month and then discharged to [facility] where she was there almost another month. She has been home now for about 3-4 weeks. She continues to have issues with her blood pressure being high.     She now has a medical alert bracelet.     She has home health aide, PT and OT. She admits she does not always let them come in and discussed how important it is that she let them care for her. She expressed understanding and will let them in next visit.     She continues to feel \"lousy\" and I am concerned this is due to her untreated possible endometrial cancer. She reports she lost the number for the gynecologist that was going " to do her hysterectomy.  I found the phone number for her and provided it to her but also asked my staff to aid her with scheduling something as this needs to be addressed as soon as possible.    We reviewed her medicaitons in detail along with the list provided from home health and all meds are now updated in our record.     HPI  Review of Systems   Constitutional:  Positive for fatigue.   Musculoskeletal:  Positive for gait problem.   Neurological:  Positive for weakness.       Objective   BP (!) 186/79 (BP Location: Right arm, Patient Position: Sitting, Cuff Size: Standard)   Pulse 85   Wt (!) 138 kg (304 lb 3.8 oz)   SpO2 98%   BMI 49.10 kg/m²      Physical Exam  Vitals reviewed.   Constitutional:       General: She is not in acute distress.     Appearance: She is obese. She is not ill-appearing.      Comments: Wheel chair    HENT:      Head: Normocephalic and atraumatic.   Cardiovascular:      Rate and Rhythm: Normal rate and regular rhythm.      Heart sounds: Normal heart sounds.   Pulmonary:      Effort: Pulmonary effort is normal.      Breath sounds: Normal breath sounds.   Neurological:      Mental Status: She is alert.      Motor: Weakness present.   Psychiatric:         Attention and Perception: Attention normal.         Mood and Affect: Mood is depressed. Affect is labile.         Speech: Speech is delayed.         Behavior: Behavior normal. Behavior is cooperative.         Thought Content: Thought content normal.

## 2025-01-24 NOTE — ASSESSMENT & PLAN NOTE
Most recent TSH/T4 was consistent with supratherapeutic dosing and this was done while she was in the hospital.  They had decreased her to levothyroxine 150 mcg daily however since she has been home she reports she only had the 200 mcg and has been taking this again.  I sent for 150 mcg for her and told her she needs to start this lower dose as well as obtain blood work in the next week that we can review at her follow-up with me in 2 weeks      Orders:    TSH, 3rd generation with Free T4 reflex; Future    levothyroxine 150 mcg tablet; Take 1 tablet (150 mcg total) by mouth daily in the early morning

## 2025-01-24 NOTE — ASSESSMENT & PLAN NOTE
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 Patient currently in wheel chair.  Her goal would be to return to walking with a cane and overall improve her ambulatory status.  She never completed any aggressive therapy following her stroke. Since recent hospital stay she has been working regularly with PT at home. Continue this. Home Health  also mentioned they are trying to help get her into long term care as this is my recommendation at this time given overall lack of improvement in ambulatory status and memory struggles this past year. She has been hospitalized multiple times and I am concerned she is not taking care of herself at home and missing medications.

## 2025-01-24 NOTE — ASSESSMENT & PLAN NOTE
history of MI per patient, without stent placement? (History unclear)  Cardiology saw patient in hospital 6/2021 - patient with NSTEMI/Takotsubo cardiomyopathy thought to be 2/2 uncontrolled hypothyroidism. Echo at the time EF 30-35%. Patient discharged when thyroid improved and was to f/u with cardiology and have follow up echo but never did.    During her recent hospital stay she had an echocardiogram completed on 11/28/2024 that showed LVEF 50 to 54% with normal wall motion and indeterminate diastolic function.  Aortic valve was not well-visualized however there was no signs of regurgitation or stenosis.    Continue aspirin, statin, Plavix, Zetia, metoprolol    Orders:    Comprehensive metabolic panel; Future

## 2025-01-24 NOTE — ASSESSMENT & PLAN NOTE
Lab Results   Component Value Date    EGFR 95 11/30/2024    EGFR 77 11/28/2024    EGFR 89 11/27/2024    CREATININE 0.68 11/30/2024    CREATININE 0.83 11/28/2024    CREATININE 0.74 11/27/2024     Most recent labs have been showing significant improvement in her renal function  We will continue to monitor this closely    Orders:    Comprehensive metabolic panel; Future

## 2025-01-27 ENCOUNTER — TELEPHONE (OUTPATIENT)
Age: 65
End: 2025-01-27

## 2025-01-27 NOTE — TELEPHONE ENCOUNTER
Received call from PAMELA Christy with [facility] stating that she performed a reassessment on patient today and will continue to see her for OT 1 x per week for 3 weeks.

## 2025-02-01 ENCOUNTER — TELEPHONE (OUTPATIENT)
Dept: OTHER | Facility: OTHER | Age: 65
End: 2025-02-01

## 2025-02-01 NOTE — TELEPHONE ENCOUNTER
"[facility] home health care stated, I am calling to report the pt's pain of a 10/10.\"     Paged on call VIA EPIC  "

## 2025-02-03 NOTE — TELEPHONE ENCOUNTER
Left a message for patient o call us back, just checking in to see how patient is feeling. Will call her later on today to check in on her again.

## 2025-02-03 NOTE — TELEPHONE ENCOUNTER
Spoke to patient she says she isn't feeling well and doesn't want to get out of bed.  She fell over the weekend again and refuses to go to the hospital. She stated she called the ambulance to come pick her off the floor  but nothing else. Patient has a   Appointment on Friday the 7th with the provider.She states that she needs refills on her medications especially her pain medications. She said she is black and blue from falling and she is waiting for medical alert to come out and hook that up for her. Patient did also state that she has mouse in her apartment and [facility] is aware and told her she is fine where she is. Patient stated that her son and her are getting application for [employer] apartments in [place]. So she can get out of mice infested building.

## 2025-02-04 ENCOUNTER — TELEPHONE (OUTPATIENT)
Dept: FAMILY MEDICINE CLINIC | Facility: CLINIC | Age: 65
End: 2025-02-04

## 2025-02-04 ENCOUNTER — TELEPHONE (OUTPATIENT)
Age: 65
End: 2025-02-04

## 2025-02-04 NOTE — TELEPHONE ENCOUNTER
Called [facility] for patient and have her scheduled to be picked up at 10 am on Friday. Tried to reach patient to let her know and her phone stated her number is not in service. Tried her mobile number and that came up with a message that states please try your call again later. Tried to contact her son and his phone gave the same message

## 2025-02-04 NOTE — TELEPHONE ENCOUNTER
Tried to reach patient in regards to her upcoming appointment but was unable to leave a message. It said that person you are trying to call is either busy or on the phone need to call back later.  I also called her home number and it said the number is disconnected.  Called the sons number and had the same message your call cannot be completed as dialed please try your call again later I am unable to leave a message to let her know [facility] will pick her up at 10 am for her dr appointment on Friday.

## 2025-02-04 NOTE — TELEPHONE ENCOUNTER
"Patient called in stating she is unsure that she would be able to make it to her apt this upcoming Friday 2/7   States she called her transportation service and \"they don't have me written down\" she states she has transportation services set up through her insurance company    I advised she call them to see how they can accommodate her and explain the process more in depth as she states \"this is all new to me\"  Patient encouraged to keep apt and call Friday 2 hrs prior to cancel if she is unable to make it  "

## 2025-02-04 NOTE — TELEPHONE ENCOUNTER
Spoke to [facility] and have patient scheduled for appointment Feb 17 th at 10 am with Dr. Walker and called patient and left a VM in regards to Her OBGYN appointment and the fact the need to get lab work done before her appointment on the 7th.

## 2025-02-05 NOTE — TELEPHONE ENCOUNTER
Left a VM for patient in regards to her appointment on Fri Transportation will pick her up at 10am for her dr appointment on Fri the 7th and She also has an appointment 17 with [facility] for pain in stomach and to access surgery options. She needs to call and get transportation set up and confirm the time of her appointment

## 2025-02-06 ENCOUNTER — TELEPHONE (OUTPATIENT)
Age: 65
End: 2025-02-06

## 2025-02-06 NOTE — TELEPHONE ENCOUNTER
Behind right knee fold it looks like a fungal infection starting. Asking for treatment plan.     Patient also being noncompliant with her medications. Asking for this to be addressed tomorrow at patients appt tomorrow.

## 2025-02-07 ENCOUNTER — APPOINTMENT (OUTPATIENT)
Dept: LAB | Facility: CLINIC | Age: 65
End: 2025-02-07
Payer: COMMERCIAL

## 2025-02-07 ENCOUNTER — OFFICE VISIT (OUTPATIENT)
Dept: FAMILY MEDICINE CLINIC | Facility: CLINIC | Age: 65
End: 2025-02-07
Payer: COMMERCIAL

## 2025-02-07 VITALS
SYSTOLIC BLOOD PRESSURE: 180 MMHG | OXYGEN SATURATION: 98 % | DIASTOLIC BLOOD PRESSURE: 90 MMHG | BODY MASS INDEX: 47.36 KG/M2 | WEIGHT: 293 LBS | HEART RATE: 89 BPM

## 2025-02-07 DIAGNOSIS — B37.2 CANDIDAL INTERTRIGO: ICD-10-CM

## 2025-02-07 DIAGNOSIS — E78.2 MIXED HYPERLIPIDEMIA: ICD-10-CM

## 2025-02-07 DIAGNOSIS — F41.1 GAD (GENERALIZED ANXIETY DISORDER): ICD-10-CM

## 2025-02-07 DIAGNOSIS — I69.30 HISTORY OF CVA WITH RESIDUAL DEFICIT: ICD-10-CM

## 2025-02-07 DIAGNOSIS — E66.01 CLASS 3 SEVERE OBESITY DUE TO EXCESS CALORIES WITH SERIOUS COMORBIDITY AND BODY MASS INDEX (BMI) OF 45.0 TO 49.9 IN ADULT (HCC): ICD-10-CM

## 2025-02-07 DIAGNOSIS — F01.B3 MODERATE VASCULAR DEMENTIA WITH MOOD DISTURBANCE (HCC): ICD-10-CM

## 2025-02-07 DIAGNOSIS — I10 PRIMARY HYPERTENSION: Primary | ICD-10-CM

## 2025-02-07 DIAGNOSIS — E03.9 ACQUIRED HYPOTHYROIDISM: ICD-10-CM

## 2025-02-07 DIAGNOSIS — E66.813 CLASS 3 SEVERE OBESITY DUE TO EXCESS CALORIES WITH SERIOUS COMORBIDITY AND BODY MASS INDEX (BMI) OF 45.0 TO 49.9 IN ADULT (HCC): ICD-10-CM

## 2025-02-07 DIAGNOSIS — Z91.199 NON-COMPLIANCE WITH TREATMENT: ICD-10-CM

## 2025-02-07 DIAGNOSIS — I25.10 CORONARY ARTERY DISEASE INVOLVING NATIVE CORONARY ARTERY OF NATIVE HEART WITHOUT ANGINA PECTORIS: ICD-10-CM

## 2025-02-07 LAB
ALBUMIN SERPL BCG-MCNC: 4.1 G/DL (ref 3.5–5)
ALP SERPL-CCNC: 160 U/L (ref 34–104)
ALT SERPL W P-5'-P-CCNC: 13 U/L (ref 7–52)
ANION GAP SERPL CALCULATED.3IONS-SCNC: 12 MMOL/L (ref 4–13)
AST SERPL W P-5'-P-CCNC: 19 U/L (ref 13–39)
BASOPHILS # BLD AUTO: 0.07 THOUSANDS/ΜL (ref 0–0.1)
BASOPHILS NFR BLD AUTO: 1 % (ref 0–1)
BILIRUB SERPL-MCNC: 0.41 MG/DL (ref 0.2–1)
BUN SERPL-MCNC: 18 MG/DL (ref 5–25)
CALCIUM SERPL-MCNC: 10.5 MG/DL (ref 8.4–10.2)
CHLORIDE SERPL-SCNC: 104 MMOL/L (ref 96–108)
CO2 SERPL-SCNC: 27 MMOL/L (ref 21–32)
CREAT SERPL-MCNC: 0.71 MG/DL (ref 0.6–1.3)
EOSINOPHIL # BLD AUTO: 0.32 THOUSAND/ΜL (ref 0–0.61)
EOSINOPHIL NFR BLD AUTO: 6 % (ref 0–6)
ERYTHROCYTE [DISTWIDTH] IN BLOOD BY AUTOMATED COUNT: 15.6 % (ref 11.6–15.1)
GFR SERPL CREATININE-BSD FRML MDRD: 88 ML/MIN/1.73SQ M
GLUCOSE P FAST SERPL-MCNC: 106 MG/DL (ref 65–99)
HCT VFR BLD AUTO: 47.4 % (ref 34.8–46.1)
HGB BLD-MCNC: 13.7 G/DL (ref 11.5–15.4)
IMM GRANULOCYTES # BLD AUTO: 0.01 THOUSAND/UL (ref 0–0.2)
IMM GRANULOCYTES NFR BLD AUTO: 0 % (ref 0–2)
LYMPHOCYTES # BLD AUTO: 2.11 THOUSANDS/ΜL (ref 0.6–4.47)
LYMPHOCYTES NFR BLD AUTO: 40 % (ref 14–44)
MCH RBC QN AUTO: 25.3 PG (ref 26.8–34.3)
MCHC RBC AUTO-ENTMCNC: 28.9 G/DL (ref 31.4–37.4)
MCV RBC AUTO: 88 FL (ref 82–98)
MONOCYTES # BLD AUTO: 0.54 THOUSAND/ΜL (ref 0.17–1.22)
MONOCYTES NFR BLD AUTO: 10 % (ref 4–12)
NEUTROPHILS # BLD AUTO: 2.27 THOUSANDS/ΜL (ref 1.85–7.62)
NEUTS SEG NFR BLD AUTO: 43 % (ref 43–75)
NRBC BLD AUTO-RTO: 0 /100 WBCS
PLATELET # BLD AUTO: 266 THOUSANDS/UL (ref 149–390)
PMV BLD AUTO: 10.8 FL (ref 8.9–12.7)
POTASSIUM SERPL-SCNC: 4.8 MMOL/L (ref 3.5–5.3)
PROT SERPL-MCNC: 7 G/DL (ref 6.4–8.4)
RBC # BLD AUTO: 5.42 MILLION/UL (ref 3.81–5.12)
SODIUM SERPL-SCNC: 143 MMOL/L (ref 135–147)
T4 FREE SERPL-MCNC: 1.19 NG/DL (ref 0.61–1.12)
TSH SERPL DL<=0.05 MIU/L-ACNC: 0.04 UIU/ML (ref 0.45–4.5)
VIT B12 SERPL-MCNC: 413 PG/ML (ref 180–914)
WBC # BLD AUTO: 5.32 THOUSAND/UL (ref 4.31–10.16)

## 2025-02-07 PROCEDURE — 99214 OFFICE O/P EST MOD 30 MIN: CPT | Performed by: FAMILY MEDICINE

## 2025-02-07 PROCEDURE — G2211 COMPLEX E/M VISIT ADD ON: HCPCS | Performed by: FAMILY MEDICINE

## 2025-02-07 RX ORDER — NYSTATIN 100000 U/G
CREAM TOPICAL 2 TIMES DAILY
Qty: 30 G | Refills: 1 | Status: SHIPPED | OUTPATIENT
Start: 2025-02-07

## 2025-02-07 RX ORDER — METOPROLOL SUCCINATE 50 MG/1
50 TABLET, EXTENDED RELEASE ORAL DAILY
Qty: 90 TABLET | Refills: 1 | Status: SHIPPED | OUTPATIENT
Start: 2025-02-07

## 2025-02-07 RX ORDER — CLOPIDOGREL BISULFATE 75 MG/1
75 TABLET ORAL DAILY
Qty: 90 TABLET | Refills: 0 | Status: SHIPPED | OUTPATIENT
Start: 2025-02-07

## 2025-02-07 RX ORDER — DONEPEZIL HYDROCHLORIDE 10 MG/1
10 TABLET, FILM COATED ORAL
Qty: 90 TABLET | Refills: 0 | Status: SHIPPED | OUTPATIENT
Start: 2025-02-07

## 2025-02-07 RX ORDER — LOSARTAN POTASSIUM 50 MG/1
50 TABLET ORAL DAILY
Qty: 90 TABLET | Refills: 1 | Status: SHIPPED | OUTPATIENT
Start: 2025-02-07

## 2025-02-07 RX ORDER — ATORVASTATIN CALCIUM 80 MG/1
80 TABLET, FILM COATED ORAL DAILY
Qty: 90 TABLET | Refills: 0 | Status: SHIPPED | OUTPATIENT
Start: 2025-02-07

## 2025-02-07 RX ORDER — NYSTATIN 100000 [USP'U]/G
POWDER TOPICAL 3 TIMES DAILY
Qty: 60 G | Refills: 0 | Status: SHIPPED | OUTPATIENT
Start: 2025-02-07

## 2025-02-07 RX ORDER — EZETIMIBE 10 MG/1
10 TABLET ORAL DAILY
Qty: 90 TABLET | Refills: 0 | Status: SHIPPED | OUTPATIENT
Start: 2025-02-07

## 2025-02-07 RX ORDER — FLUOXETINE 40 MG/1
40 CAPSULE ORAL DAILY
Qty: 90 CAPSULE | Refills: 0 | Status: SHIPPED | OUTPATIENT
Start: 2025-02-07

## 2025-02-07 NOTE — ASSESSMENT & PLAN NOTE
Patient reports she has not been taking her medications. She reports myself, her children and home health keep telling her the importance of taking her medications and she does for short periods of time but then she just stops taking them. She cannot give me a clear reason as to why. I continue to discuss we should be looking at long-term housing placement for her in nursing facility as she would get better assistance with her complex medical history of care. She refuses nursing level facility care. I called [facility] and am awaiting a call back to discuss her care.     Again stressed the importance of seeing her gynecologist as there is high concern she has endometrial cancer and still has not had the surgery for this.     The combination of this and med non-compliance is likely the source of a lot of her symptoms she reports today including diarrhea, nausea, no appetite, weight loss, headaches, blurry vision.     Our staff called her gynecologist to try scheduling her today but had to leave a voicemail.

## 2025-02-07 NOTE — ASSESSMENT & PLAN NOTE
Orders:    atorvastatin (LIPITOR) 80 mg tablet; Take 1 tablet (80 mg total) by mouth daily    ezetimibe (ZETIA) 10 mg tablet; Take 1 tablet (10 mg total) by mouth daily

## 2025-02-07 NOTE — ASSESSMENT & PLAN NOTE
Orders:    atorvastatin (LIPITOR) 80 mg tablet; Take 1 tablet (80 mg total) by mouth daily    clopidogrel (PLAVIX) 75 mg tablet; Take 1 tablet (75 mg total) by mouth daily

## 2025-02-07 NOTE — ASSESSMENT & PLAN NOTE
history of MI per patient, without stent placement? (History unclear)  Cardiology saw patient in hospital 6/2021 - patient with NSTEMI/Takotsubo cardiomyopathy thought to be 2/2 uncontrolled hypothyroidism. Echo at the time EF 30-35%. Patient discharged when thyroid improved and was to f/u with cardiology and have follow up echo but never did.     During her recent hospital stay she had an echocardiogram completed on 11/28/2024 that showed LVEF 50 to 54% with normal wall motion and indeterminate diastolic function.  Aortic valve was not well-visualized however there was no signs of regurgitation or stenosis.     Continue aspirin, statin, Plavix, Zetia, metoprolol  Orders:    metoprolol succinate (TOPROL-XL) 50 mg 24 hr tablet; Take 1 tablet (50 mg total) by mouth daily    atorvastatin (LIPITOR) 80 mg tablet; Take 1 tablet (80 mg total) by mouth daily    clopidogrel (PLAVIX) 75 mg tablet; Take 1 tablet (75 mg total) by mouth daily

## 2025-02-07 NOTE — PATIENT INSTRUCTIONS
Increase metoprolol to 50mg daily  Continue Losartan 50mg daily   Nystatin cream and powder sent in for her leg rash

## 2025-02-07 NOTE — ASSESSMENT & PLAN NOTE
Chronic, not well-controlled and there is a lot of concern regarding compliance with her medications  Also concerning that her most recent labs are consistent with hyperthyroidism   Again today she admits to not taking her medications, TSH checked today and pending    Continue Losartan 50mg daily  Increase metoprolol to 50mg daily     Attempted to contact [facility] that goes to patient's home to update them on med changes and see if there is any help we can get in med compliance.     Follow-up in 2 weeks to recheck    Orders:    metoprolol succinate (TOPROL-XL) 50 mg 24 hr tablet; Take 1 tablet (50 mg total) by mouth daily    losartan (COZAAR) 50 mg tablet; Take 1 tablet (50 mg total) by mouth daily

## 2025-02-07 NOTE — ASSESSMENT & PLAN NOTE
Orders:    atorvastatin (LIPITOR) 80 mg tablet; Take 1 tablet (80 mg total) by mouth daily    donepezil (Aricept) 10 mg tablet; Take 1 tablet (10 mg total) by mouth daily at bedtime

## 2025-02-07 NOTE — ASSESSMENT & PLAN NOTE
Most recent TSH/T4 was consistent with supratherapeutic dosing and this was done while she was in the hospital.  They had decreased her to levothyroxine 150 mcg daily however since she has been home she reports she only had the 200 mcg and has been taking this again.  I sent for 150 mcg for her two weeks ago but it does not sound like she picked this up. Tsh checked today. Stressed to her the importance of getting the new dose and taking it consistently     F/u 2 weeks

## 2025-02-07 NOTE — PROGRESS NOTES
Name: Ginna Landeros      : 1960      MRN: [id number]  Encounter Provider: Mallika Avila DO  Encounter Date: 2025   Encounter department: [facility] PRIMARY CARE  :  Assessment & Plan  Primary hypertension  Chronic, not well-controlled and there is a lot of concern regarding compliance with her medications  Also concerning that her most recent labs are consistent with hyperthyroidism   Again today she admits to not taking her medications, TSH checked today and pending    Continue Losartan 50mg daily  Increase metoprolol to 50mg daily     Attempted to contact [facility] that goes to patient's home to update them on med changes and see if there is any help we can get in med compliance.     Follow-up in 2 weeks to recheck    Orders:    metoprolol succinate (TOPROL-XL) 50 mg 24 hr tablet; Take 1 tablet (50 mg total) by mouth daily    losartan (COZAAR) 50 mg tablet; Take 1 tablet (50 mg total) by mouth daily      Acquired hypothyroidism  Most recent TSH/T4 was consistent with supratherapeutic dosing and this was done while she was in the hospital.  They had decreased her to levothyroxine 150 mcg daily however since she has been home she reports she only had the 200 mcg and has been taking this again.  I sent for 150 mcg for her two weeks ago but it does not sound like she picked this up. Tsh checked today. Stressed to her the importance of getting the new dose and taking it consistently     F/u 2 weeks            Candidal intertrigo  Restart nystatin powder and cream for rash behind right leg.     Orders:    nystatin (MYCOSTATIN) cream; Apply topically 2 (two) times a day    nystatin (MYCOSTATIN) powder; Apply topically 3 (three) times a day    Class 3 severe obesity due to excess calories with serious comorbidity and body mass index (BMI) of 45.0 to 49.9 in adult (HCC)  Continue to monitor          Coronary artery disease involving native coronary artery of native  heart without angina pectoris  history of MI per patient, without stent placement? (History unclear)  Cardiology saw patient in hospital 6/2021 - patient with NSTEMI/Takotsubo cardiomyopathy thought to be 2/2 uncontrolled hypothyroidism. Echo at the time EF 30-35%. Patient discharged when thyroid improved and was to f/u with cardiology and have follow up echo but never did.     During her recent hospital stay she had an echocardiogram completed on 11/28/2024 that showed LVEF 50 to 54% with normal wall motion and indeterminate diastolic function.  Aortic valve was not well-visualized however there was no signs of regurgitation or stenosis.     Continue aspirin, statin, Plavix, Zetia, metoprolol  Orders:    metoprolol succinate (TOPROL-XL) 50 mg 24 hr tablet; Take 1 tablet (50 mg total) by mouth daily    atorvastatin (LIPITOR) 80 mg tablet; Take 1 tablet (80 mg total) by mouth daily    clopidogrel (PLAVIX) 75 mg tablet; Take 1 tablet (75 mg total) by mouth daily    Non-compliance with treatment  Patient reports she has not been taking her medications. She reports myself, her children and home health keep telling her the importance of taking her medications and she does for short periods of time but then she just stops taking them. She cannot give me a clear reason as to why. I continue to discuss we should be looking at long-term housing placement for her in nursing facility as she would get better assistance with her complex medical history of care. She refuses nursing level facility care. I called [facility] and am awaiting a call back to discuss her care.     Again stressed the importance of seeing her gynecologist as there is high concern she has endometrial cancer and still has not had the surgery for this.     The combination of this and med non-compliance is likely the source of a lot of her symptoms she reports today including diarrhea, nausea, no appetite, weight loss, headaches, blurry vision.      Our staff called her gynecologist to try scheduling her today but had to leave a voicemail.        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)    Orders:    atorvastatin (LIPITOR) 80 mg tablet; Take 1 tablet (80 mg total) by mouth daily    donepezil (Aricept) 10 mg tablet; Take 1 tablet (10 mg total) by mouth daily at bedtime    History of CVA with residual deficit    Orders:    atorvastatin (LIPITOR) 80 mg tablet; Take 1 tablet (80 mg total) by mouth daily    clopidogrel (PLAVIX) 75 mg tablet; Take 1 tablet (75 mg total) by mouth daily    Mixed hyperlipidemia    Orders:    atorvastatin (LIPITOR) 80 mg tablet; Take 1 tablet (80 mg total) by mouth daily    ezetimibe (ZETIA) 10 mg tablet; Take 1 tablet (10 mg total) by mouth daily    SEUN (generalized anxiety disorder)    Orders:    FLUoxetine (PROzac) 40 MG capsule; Take 1 capsule (40 mg total) by mouth daily      Return in about 3 weeks (around 2/28/2025) for Recheck.       History of Present Illness   Chief Complaint   Patient presents with    Follow-up      return in about 2 weeks for recheck HTN     Patient presents for HTN follow up. She has not been taking her medication. She reports diarrhea, nausea, no appetite, weight loss, headaches, blurry vision. She said home health is coming again today. She says everyone is trying to get her to take her meds but she just forgets and feels like taking them all the time is a burden. She is unhappy where she is living currently and hopeful to have a new appt soon. She is told if she falls again, she cannot refuse ER eval, she needs to go to the ER when these things happen.       Meds will again be refilled and she needs to pick these up ASAP as she says she is out of a lot of her meds.       HPI    Objective   BP (!) 180/90 (BP Location: Right arm, Patient Position: Sitting, Cuff Size: Standard)   Pulse 89   Wt 133 kg (293 lb 6.9 oz)   SpO2 98%   BMI 47.36 kg/m²      Physical Exam  Vitals reviewed.    Constitutional:       General: She is not in acute distress.     Appearance: She is obese. She is not ill-appearing.      Comments: Wheel chair, frail    HENT:      Head: Normocephalic and atraumatic.   Cardiovascular:      Rate and Rhythm: Normal rate and regular rhythm.      Heart sounds: Normal heart sounds.   Pulmonary:      Effort: Pulmonary effort is normal.      Breath sounds: Normal breath sounds.   Neurological:      Mental Status: She is alert.      Motor: Weakness present.   Psychiatric:         Attention and Perception: Attention normal.         Mood and Affect: Mood is depressed.         Behavior: Behavior normal. Behavior is cooperative.         Thought Content: Thought content normal.

## 2025-02-11 ENCOUNTER — TELEPHONE (OUTPATIENT)
Age: 65
End: 2025-02-11

## 2025-02-11 NOTE — TELEPHONE ENCOUNTER
[facility] calls to report that the patient has met her max potential and has been D/C'd from PT services. Patient still has skilled nursing services in the home.

## 2025-02-13 ENCOUNTER — TELEPHONE (OUTPATIENT)
Age: 65
End: 2025-02-13

## 2025-02-13 NOTE — TELEPHONE ENCOUNTER
calls to report that the patient did not have any  needs. Patient has transportation, meals, emergency response system, and home aides. No current needs.

## 2025-02-17 ENCOUNTER — TELEPHONE (OUTPATIENT)
Age: 65
End: 2025-02-17

## 2025-02-17 DIAGNOSIS — E03.9 ACQUIRED HYPOTHYROIDISM: ICD-10-CM

## 2025-02-17 NOTE — TELEPHONE ENCOUNTER
Joselyn from [facility] called for a verbal ok to treat the patient for depression. Clinical assistance was unavailable at the time of the call. Please return call at [phone].

## 2025-02-18 RX ORDER — LEVOTHYROXINE SODIUM 150 UG/1
150 TABLET ORAL
Qty: 30 TABLET | Refills: 3 | OUTPATIENT
Start: 2025-02-18

## 2025-02-21 ENCOUNTER — TELEPHONE (OUTPATIENT)
Age: 65
End: 2025-02-21

## 2025-02-21 NOTE — TELEPHONE ENCOUNTER
Patient called in to advise that [facility] will no longer be coming to the house due to insurance not covering them and wants to see if insurance will cover another company. Please advise     Patient also states the gynecology would like to proceed with surgery for ovarian cancer and is waiting for the okay from . patient has a follow up in office next week.

## 2025-02-24 DIAGNOSIS — I69.30 HISTORY OF CVA WITH RESIDUAL DEFICIT: ICD-10-CM

## 2025-02-24 DIAGNOSIS — R26.2 AMBULATORY DYSFUNCTION: Primary | ICD-10-CM

## 2025-02-24 DIAGNOSIS — F01.B3 MODERATE VASCULAR DEMENTIA WITH MOOD DISTURBANCE (HCC): ICD-10-CM

## 2025-02-24 RX ORDER — OMEPRAZOLE 40 MG/1
CAPSULE, DELAYED RELEASE ORAL
COMMUNITY

## 2025-02-24 RX ORDER — NAPROXEN SODIUM 550 MG/1
TABLET ORAL
COMMUNITY
Start: 2025-02-17

## 2025-02-24 NOTE — TELEPHONE ENCOUNTER
LMOM requesting call back to update patient and advise of change to Fridays appt. New referral for [facility]ed to [facility].

## 2025-02-27 ENCOUNTER — TELEPHONE (OUTPATIENT)
Age: 65
End: 2025-02-27

## 2025-02-27 RX ORDER — NAPROXEN 500 MG/1
TABLET ORAL
COMMUNITY
Start: 2025-02-26

## 2025-02-27 NOTE — TELEPHONE ENCOUNTER
Mirlande from [facility] called and received the script but they need the pts med list, office notes and the demographics as well. Please fax to [phone] thank you.

## 2025-02-28 ENCOUNTER — CONSULT (OUTPATIENT)
Dept: FAMILY MEDICINE CLINIC | Facility: CLINIC | Age: 65
End: 2025-02-28
Payer: COMMERCIAL

## 2025-02-28 VITALS
TEMPERATURE: 97.2 F | OXYGEN SATURATION: 98 % | BODY MASS INDEX: 47.09 KG/M2 | HEIGHT: 66 IN | WEIGHT: 293 LBS | DIASTOLIC BLOOD PRESSURE: 80 MMHG | SYSTOLIC BLOOD PRESSURE: 148 MMHG | HEART RATE: 52 BPM

## 2025-02-28 DIAGNOSIS — Z91.199 NON-COMPLIANCE WITH TREATMENT: ICD-10-CM

## 2025-02-28 DIAGNOSIS — Z01.818 PRE-OP EXAMINATION: ICD-10-CM

## 2025-02-28 DIAGNOSIS — F01.B3 MODERATE VASCULAR DEMENTIA WITH MOOD DISTURBANCE (HCC): ICD-10-CM

## 2025-02-28 DIAGNOSIS — E78.2 MIXED HYPERLIPIDEMIA: ICD-10-CM

## 2025-02-28 DIAGNOSIS — J30.9 CHRONIC ALLERGIC RHINITIS: ICD-10-CM

## 2025-02-28 DIAGNOSIS — I25.10 CORONARY ARTERY DISEASE INVOLVING NATIVE CORONARY ARTERY OF NATIVE HEART WITHOUT ANGINA PECTORIS: ICD-10-CM

## 2025-02-28 DIAGNOSIS — E03.9 ACQUIRED HYPOTHYROIDISM: ICD-10-CM

## 2025-02-28 DIAGNOSIS — N18.32 STAGE 3B CHRONIC KIDNEY DISEASE (HCC): ICD-10-CM

## 2025-02-28 DIAGNOSIS — I10 PRIMARY HYPERTENSION: Primary | ICD-10-CM

## 2025-02-28 PROCEDURE — 93000 ELECTROCARDIOGRAM COMPLETE: CPT | Performed by: FAMILY MEDICINE

## 2025-02-28 PROCEDURE — G2211 COMPLEX E/M VISIT ADD ON: HCPCS | Performed by: FAMILY MEDICINE

## 2025-02-28 PROCEDURE — 99214 OFFICE O/P EST MOD 30 MIN: CPT | Performed by: FAMILY MEDICINE

## 2025-02-28 RX ORDER — CETIRIZINE HYDROCHLORIDE 10 MG/1
10 TABLET ORAL DAILY
Qty: 90 TABLET | Refills: 1 | Status: SHIPPED | OUTPATIENT
Start: 2025-02-28

## 2025-02-28 NOTE — ASSESSMENT & PLAN NOTE
Most recent TSH/T4 was consistent with supratherapeutic.   Patient admits she was continuing on the higher dose of 200mcg daily because she never picked up the 150mcg  She just started the 150mcg yesterday.   Repeat labs in 3 weeks, follow up in office in 4 weeks    Patient aware we NEED to get her thyroid controlled before OBGYN will complete the hysteroscopy for concern of endometiral cancer.     Orders:    TSH, 3rd generation; Future    T4, free; Future

## 2025-02-28 NOTE — ASSESSMENT & PLAN NOTE
Lab Results   Component Value Date    EGFR 88 02/07/2025    EGFR 95 11/30/2024    EGFR 77 11/28/2024    CREATININE 0.71 02/07/2025    CREATININE 0.68 11/30/2024    CREATININE 0.83 11/28/2024     Most recent labs have been showing significant improvement in her renal function  We will continue to monitor this closely

## 2025-02-28 NOTE — ASSESSMENT & PLAN NOTE
Restart zyrtec 10mg daily    Orders:    cetirizine (ZyrTEC) 10 mg tablet; Take 1 tablet (10 mg total) by mouth daily

## 2025-02-28 NOTE — ASSESSMENT & PLAN NOTE
history of MI per patient, without stent placement? (History unclear)  Cardiology saw patient in hospital 6/2021 - patient with NSTEMI/Takotsubo cardiomyopathy thought to be 2/2 uncontrolled hypothyroidism. Echo at the time EF 30-35%. Patient discharged when thyroid improved and was to f/u with cardiology and have follow up echo but never did.     During her hospital stay she had an echocardiogram completed on 11/28/2024 that showed LVEF 50 to 54% with normal wall motion and indeterminate diastolic function.  Aortic valve was not well-visualized however there was no signs of regurgitation or stenosis.     Continue aspirin, statin, Plavix, Zetia, metoprolol  Orders:    POCT ECG    CBC and differential; Future    Comprehensive metabolic panel; Future    Lipid panel; Future    ECG 12 lead; Future

## 2025-02-28 NOTE — PROGRESS NOTES
Name: Ginna Landeros      : 1960      MRN: [id number]  Encounter Provider: Mallika Avila DO  Encounter Date: 2025   Encounter department: [facility] PRIMARY CARE  :  Assessment & Plan  Primary hypertension  Chronic  Patient now reporting compliance with meds  Current Blood Pressure: 148/80  Continue Losartan 50mg daily, metoprolol to 50mg daily     Repeat labs again in 3 weeks, follow up in 4 weeks     Orders:    CBC and differential; Future    Comprehensive metabolic panel; Future    ECG 12 lead; Future        Coronary artery disease involving native coronary artery of native heart without angina pectoris  history of MI per patient, without stent placement? (History unclear)  Cardiology saw patient in hospital 2021 - patient with NSTEMI/Takotsubo cardiomyopathy thought to be 2/2 uncontrolled hypothyroidism. Echo at the time EF 30-35%. Patient discharged when thyroid improved and was to f/u with cardiology and have follow up echo but never did.     During her hospital stay she had an echocardiogram completed on 2024 that showed LVEF 50 to 54% with normal wall motion and indeterminate diastolic function.  Aortic valve was not well-visualized however there was no signs of regurgitation or stenosis.     Continue aspirin, statin, Plavix, Zetia, metoprolol  Orders:    POCT ECG    CBC and differential; Future    Comprehensive metabolic panel; Future    Lipid panel; Future    ECG 12 lead; Future      Acquired hypothyroidism  Most recent TSH/T4 was consistent with supratherapeutic.   Patient admits she was continuing on the higher dose of 200mcg daily because she never picked up the 150mcg  She just started the 150mcg yesterday.   Repeat labs in 3 weeks, follow up in office in 4 weeks    Patient aware we NEED to get her thyroid controlled before OBGYN will complete the hysteroscopy for concern of endometiral cancer.     Orders:    TSH, 3rd generation; Future    T4, free;  Future      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)  Continue asa, statin, aricept    Orders:    Lipid panel; Future    Stage 3b chronic kidney disease (HCC)  Lab Results   Component Value Date    EGFR 88 02/07/2025    EGFR 95 11/30/2024    EGFR 77 11/28/2024    CREATININE 0.71 02/07/2025    CREATININE 0.68 11/30/2024    CREATININE 0.83 11/28/2024     Most recent labs have been showing significant improvement in her renal function  We will continue to monitor this closely           Non-compliance with treatment  Patient Fluctuates with med compliance. At this time, she reports good med compliance since her last visit where she had stopped taking them.     She reports myself, her children and home health keep telling her the importance of taking her medications and she does for short periods of time but then she just stops taking them. She cannot give me a clear reason as to why. I continue to discuss we should be looking at long-term housing placement for her in nursing facility as she would get better assistance with her complex medical history of care. She refuses nursing level facility care.    Unfortunately with her recent insurance change her home health agency can no longer see her, and they signed off care last week.  We are in the process of getting a new home health agency and to assist with care.    Patient did follow-up on my prior recommendation that she needed to check in with her gynecologist regarding plan.  She did see them and they again would like to proceed with hysteroscopy and biopsy for severe concern of endometrial cancer however they will not do the procedure until her thyroid is better controlled.    Follow-up in 4 weeks following repeated labs and hope for preop clearance         Mixed hyperlipidemia  Lab Results   Component Value Date    CHOLESTEROL 75 10/31/2024    TRIG 65 10/31/2024    HDL 33 (L) 10/31/2024    LDLCALC 29 10/31/2024     Continue statin, zetia    Orders:     "Lipid panel; Future    Pre-op examination  ECG with bradycardia, will repeat and update labs  Not cleared at this time  Patient to update labs and follow up in 4 weeks to re-attempt clearance     Orders:    POCT ECG    Chronic allergic rhinitis  Restart zyrtec 10mg daily    Orders:    cetirizine (ZyrTEC) 10 mg tablet; Take 1 tablet (10 mg total) by mouth daily        Patient appears to be doing better today than prior visit, she is now ambulating again with a walker rather than a wheelchair.  I believe this is consistent with her admitted compliance to medications since her last visit.     Return in about 4 weeks (around 3/28/2025) for Pre- op exam .       History of Present Illness   Chief Complaint   Patient presents with    Follow-up    Pre-op Exam     HPI  Patient presents for follow up and possible clearance for surgery.  She at this time reports compliance with her medications but admits she only just started the lowered dose of levothyroxine yesterday.  This dose was lowered a month ago and she has continued to take the higher dosage.  We will have to wait to repeat labs until she is on this new lower dosage consistently for at least 3 weeks.  Patient is understanding of this.  She has new new complaints at this time but continues with all of her ongoing chronic symptoms that I believe are related to possibly poorly treated thyroid as well as possible underlying endometrial cancer.  She did follow-up with OB/GYN since I last saw her.  They will be planning hysteroscopy D&C to diagnose the endometrial mass, but this cannot be done until her thyroid levels are controlled.  Patient understands this.      Review of Systems   Constitutional:  Positive for fatigue.   Musculoskeletal:  Positive for gait problem.   Neurological:  Positive for weakness.       Objective   /80 (BP Location: Left arm, Patient Position: Sitting)   Pulse (!) 52   Temp (!) 97.2 °F (36.2 °C) (Tympanic)   Ht 5' 6\" (1.676 m)   Wt (!) " 138 kg (303 lb 5.7 oz)   SpO2 98%   BMI 48.96 kg/m²      Physical Exam  Vitals reviewed.   Constitutional:       General: She is not in acute distress.     Appearance: She is obese. She is not ill-appearing.      Comments: Ambulating with walker   HENT:      Head: Normocephalic and atraumatic.   Cardiovascular:      Rate and Rhythm: Normal rate and regular rhythm.      Heart sounds: Normal heart sounds.   Pulmonary:      Effort: Pulmonary effort is normal.      Breath sounds: Normal breath sounds.   Neurological:      Mental Status: She is alert.      Motor: Weakness present.   Psychiatric:         Attention and Perception: Attention normal.         Mood and Affect: Mood is depressed.         Behavior: Behavior normal. Behavior is cooperative.         Thought Content: Thought content normal.

## 2025-02-28 NOTE — ASSESSMENT & PLAN NOTE
Patient Fluctuates with med compliance. At this time, she reports good med compliance since her last visit where she had stopped taking them.     She reports myself, her children and home health keep telling her the importance of taking her medications and she does for short periods of time but then she just stops taking them. She cannot give me a clear reason as to why. I continue to discuss we should be looking at long-term housing placement for her in nursing facility as she would get better assistance with her complex medical history of care. She refuses nursing level facility care.    Unfortunately with her recent insurance change her home health agency can no longer see her, and they signed off care last week.  We are in the process of getting a new home health agency and to assist with care.    Patient did follow-up on my prior recommendation that she needed to check in with her gynecologist regarding plan.  She did see them and they again would like to proceed with hysteroscopy and biopsy for severe concern of endometrial cancer however they will not do the procedure until her thyroid is better controlled.    Follow-up in 4 weeks following repeated labs and hope for preop clearance

## 2025-02-28 NOTE — ASSESSMENT & PLAN NOTE
Lab Results   Component Value Date    CHOLESTEROL 75 10/31/2024    TRIG 65 10/31/2024    HDL 33 (L) 10/31/2024    LDLCALC 29 10/31/2024     Continue statin, zetia    Orders:    Lipid panel; Future

## 2025-02-28 NOTE — ASSESSMENT & PLAN NOTE
Chronic  Patient now reporting compliance with meds  Current Blood Pressure: 148/80  Continue Losartan 50mg daily, metoprolol to 50mg daily     Repeat labs again in 3 weeks, follow up in 4 weeks     Orders:    CBC and differential; Future    Comprehensive metabolic panel; Future    ECG 12 lead; Future

## 2025-03-01 ENCOUNTER — RESULTS FOLLOW-UP (OUTPATIENT)
Dept: FAMILY MEDICINE CLINIC | Facility: CLINIC | Age: 65
End: 2025-03-01

## 2025-03-07 ENCOUNTER — OFFICE VISIT (OUTPATIENT)
Dept: URGENT CARE | Facility: CLINIC | Age: 65
End: 2025-03-07
Payer: COMMERCIAL

## 2025-03-07 VITALS
TEMPERATURE: 97.8 F | SYSTOLIC BLOOD PRESSURE: 136 MMHG | HEART RATE: 58 BPM | OXYGEN SATURATION: 96 % | DIASTOLIC BLOOD PRESSURE: 68 MMHG | RESPIRATION RATE: 16 BRPM

## 2025-03-07 DIAGNOSIS — J02.9 SORE THROAT: ICD-10-CM

## 2025-03-07 DIAGNOSIS — B34.9 VIRAL SYNDROME: Primary | ICD-10-CM

## 2025-03-07 LAB — S PYO AG THROAT QL: NEGATIVE

## 2025-03-07 PROCEDURE — 87070 CULTURE OTHR SPECIMN AEROBIC: CPT

## 2025-03-07 PROCEDURE — 99213 OFFICE O/P EST LOW 20 MIN: CPT

## 2025-03-07 PROCEDURE — G0463 HOSPITAL OUTPT CLINIC VISIT: HCPCS

## 2025-03-07 PROCEDURE — 87880 STREP A ASSAY W/OPTIC: CPT

## 2025-03-08 NOTE — PROGRESS NOTES
[facility]        NAME: Ginna Landeros is a 65 y.o. female  : 1960    MRN: [id number]  DATE: 2025  TIME: 8:11 PM    Assessment and Plan   Viral syndrome [B34.9]  1. Viral syndrome        2. Sore throat  POCT rapid ANTIGEN strepA    Throat culture        Rapid strep: neg    No signs of bacterial etiology on exam.  Will send out throat swab for culture and call if any positive findings to start antibiotics.  Advised to continue OTC conservative measures and push fluids due to diarrhea.  Went over strict ER precautions.  Patient verbalized understanding.    Patient Instructions     Rapid strep test completed and negative. Will send for culture and call patient if positive. Infection appears viral. Recommend symptomatic treatment. Can take ibuprofen or tylenol as needed for pain or fever. Over the counter cough and cold medications to help with symptoms. Use salt water gargles for sore throat and throat lozenges. Cough drops as needed. Wash hands frequently to prevent the spread of infection. If not improving over the next 7-10 days, follow up with PCP. Symptoms may persist for 10-14 days.  To present to the ER if symptoms worsen.     If tests are performed, our office will contact you with results only if changes need to made to the care plan discussed with you at the visit. You can review your full results on [facility].    Chief Complaint     Chief Complaint   Patient presents with    Cold Like Symptoms     Started 1 day ago  Diarrhea, fatigue, and sore throat  OTC nyquil         History of Present Illness       Sore Throat   This is a new problem. The current episode started yesterday. Associated symptoms include diarrhea (x2). Pertinent negatives include no congestion, coughing, ear pain, shortness of breath, trouble swallowing or vomiting. Treatments tried: Nyquil.       Review of Systems   Review of Systems   Constitutional:  Positive for fatigue. Negative for chills,  diaphoresis and fever.   HENT:  Positive for sore throat. Negative for congestion, ear pain, postnasal drip, rhinorrhea, sinus pressure and trouble swallowing.    Respiratory:  Negative for cough, chest tightness, shortness of breath and wheezing.    Cardiovascular:  Negative for chest pain and palpitations.   Gastrointestinal:  Positive for diarrhea (x2). Negative for vomiting.   Skin:  Negative for color change.   Neurological:  Negative for dizziness and light-headedness.   Psychiatric/Behavioral:  Negative for sleep disturbance.          Current Medications       Current Outpatient Medications:     aspirin 81 mg chewable tablet, Chew 1 tablet (81 mg total) daily, Disp: 90 tablet, Rfl: 0    atorvastatin (LIPITOR) 80 mg tablet, Take 1 tablet (80 mg total) by mouth daily, Disp: 90 tablet, Rfl: 0    busPIRone (BUSPAR) 10 mg tablet, Take 1 tablet (10 mg total) by mouth 2 (two) times a day, Disp: 180 tablet, Rfl: 0    cetirizine (ZyrTEC) 10 mg tablet, Take 1 tablet (10 mg total) by mouth daily, Disp: 90 tablet, Rfl: 1    clopidogrel (PLAVIX) 75 mg tablet, Take 1 tablet (75 mg total) by mouth daily, Disp: 90 tablet, Rfl: 0    dicyclomine (BENTYL) 20 mg tablet, Take 20 mg by mouth 4 (four) times a day as needed, Disp: , Rfl:     donepezil (Aricept) 10 mg tablet, Take 1 tablet (10 mg total) by mouth daily at bedtime, Disp: 90 tablet, Rfl: 0    ezetimibe (ZETIA) 10 mg tablet, Take 1 tablet (10 mg total) by mouth daily, Disp: 90 tablet, Rfl: 0    ferrous sulfate 325 (65 Fe) mg tablet, Take 325 mg by mouth daily with breakfast, Disp: , Rfl:     FLUoxetine (PROzac) 40 MG capsule, Take 1 capsule (40 mg total) by mouth daily, Disp: 90 capsule, Rfl: 0    latanoprost (XALATAN) 0.005 % ophthalmic solution, , Disp: , Rfl:     levothyroxine 150 mcg tablet, Take 1 tablet (150 mcg total) by mouth daily in the early morning, Disp: 30 tablet, Rfl: 3    losartan (COZAAR) 50 mg tablet, Take 1 tablet (50 mg total) by mouth daily, Disp: 90  tablet, Rfl: 1    meclizine (ANTIVERT) 25 mg tablet, Take 25 mg by mouth Three times a day, Disp: , Rfl:     metoprolol succinate (TOPROL-XL) 50 mg 24 hr tablet, Take 1 tablet (50 mg total) by mouth daily, Disp: 90 tablet, Rfl: 1    naproxen (NAPROSYN) 500 mg tablet, , Disp: , Rfl:     nystatin (MYCOSTATIN) cream, Apply topically 2 (two) times a day, Disp: 30 g, Rfl: 1    nystatin (MYCOSTATIN) powder, Apply topically 3 (three) times a day, Disp: 60 g, Rfl: 0    omeprazole (PriLOSEC) 40 MG capsule, Take by mouth, Disp: , Rfl:     naproxen sodium (ANAPROX) 550 mg tablet, , Disp: , Rfl:     Current Allergies     Allergies as of 03/07/2025 - Reviewed 03/07/2025   Allergen Reaction Noted    Apple - food allergy Vomiting 03/26/2024    Ciprofloxacin Edema 03/26/2024    Mushroom extract complex - food allergy GI Intolerance 02/28/2025    Penicillins Edema 03/26/2024    Sherrill (diagnostic) - food allergy Itching 03/26/2024            The following portions of the patient's history were reviewed and updated as appropriate: allergies, current medications, past family history, past medical history, past social history, past surgical history and problem list.     Past Medical History:   Diagnosis Date    Allergic     Anemia     Asthma     Chronic kidney disease     COPD (chronic obstructive pulmonary disease) (HCC)     Crohn disease (HCC)     Dementia (HCC)     Diabetes mellitus (HCC)     borderline    Disease of thyroid gland     Emphysema lung (HCC)     GERD (gastroesophageal reflux disease)     Heart attack (HCC)     Per patient had 5    High cholesterol     Hypertension     Memory loss     Obesity     Stroke (HCC)        Past Surgical History:   Procedure Laterality Date    CYST REMOVAL N/A     on spine    MULTIPLE TOOTH EXTRACTIONS         Family History   Problem Relation Age of Onset    Stroke Mother     Cancer Mother     Heart attack Father     Dementia Father     Stroke Sister     Heart attack Sister     HIV Sister      Stroke Brother     Heart attack Brother     Hodgkin's lymphoma Brother     Hypertension Son     Heart disease Son     Blindness Son     COPD Son     Heart disease Son     Emphysema Son     Cancer Maternal Aunt          Medications have been verified.        Objective   /68   Pulse 58   Temp 97.8 °F (36.6 °C)   Resp 16   LMP  (LMP Unknown)   SpO2 96%        Physical Exam     Physical Exam  Constitutional:       General: She is not in acute distress.     Appearance: Normal appearance. She is not ill-appearing.   HENT:      Head: Normocephalic.      Nose: No congestion.      Mouth/Throat:      Mouth: Mucous membranes are moist.      Pharynx: Oropharynx is clear. Posterior oropharyngeal erythema present. No oropharyngeal exudate.   Cardiovascular:      Rate and Rhythm: Normal rate and regular rhythm.      Pulses: Normal pulses.      Heart sounds: Normal heart sounds.   Pulmonary:      Effort: Pulmonary effort is normal. No respiratory distress.      Breath sounds: Normal breath sounds. No stridor. No wheezing, rhonchi or rales.   Lymphadenopathy:      Cervical: Cervical adenopathy present.   Skin:     General: Skin is warm and dry.   Neurological:      General: No focal deficit present.      Mental Status: She is alert and oriented to person, place, and time. Mental status is at baseline.   Psychiatric:         Mood and Affect: Mood normal.         Behavior: Behavior normal.         Thought Content: Thought content normal.         Judgment: Judgment normal.

## 2025-03-09 LAB — BACTERIA THROAT CULT: NORMAL

## 2025-03-19 ENCOUNTER — TELEPHONE (OUTPATIENT)
Age: 65
End: 2025-03-19

## 2025-03-19 NOTE — TELEPHONE ENCOUNTER
Breann from [facility] called as patient is currently there to do her lab work and ECG 12 lead. Please fax orders to [phone].   
Mireya PORTER called to follow up on fax, Felisha in office is faxing over now .  
3 = A little assistance

## 2025-03-20 ENCOUNTER — TELEPHONE (OUTPATIENT)
Age: 65
End: 2025-03-20

## 2025-03-20 NOTE — TELEPHONE ENCOUNTER
Patient calls to ask about her upcoming appointment. Appointment was verified with the patient. Patient will need to have a request for handicapped parking filled out during the visit. Patient also wanted to let PCP know that was seen at urgent care on 3/7/25. Patient was diagnosed with viral syndrome.

## 2025-03-31 NOTE — PROGRESS NOTES
"PRE-OPERATIVE EXAMINATION  Ginna Landeros  1960    Ginna Landeros is a 65 y.o. female with PMH of HTN, CAD, hypothyoid, CKD, HLD, obesity, history of stroke who is planning to undergo Hysteroscopy D&C under general by Dr. Red ([facility]) on TBD (has appt tomorrow 4/2 to schedule). The procedure is indicated for the following condition: Endometrial mass. Patient has not had complications with anesthesia in the past.    ROS:   Chest pain: no   Shortness of breath: no  Shortness of breath with exertion: no  Orthopnea: no  Dizziness: yes  Unexplained weight change: yes    PMH:  CAD: yes  HTN: yes  CKD: yes  DM: no on insulin: no  History of CVA: yes    She  reports that she has been smoking cigarettes. She has been exposed to tobacco smoke. She has never used smokeless tobacco. She reports current alcohol use of about 2.0 standard drinks of alcohol per week. She reports that she does not use drugs. Smokes one cigarette per day on average    /60 (BP Location: Left arm, Patient Position: Sitting, Cuff Size: Large)   Pulse 67   Temp 97.8 °F (36.6 °C) (Tympanic)   Ht 5' 6\" (1.676 m)   Wt (!) 140 kg (309 lb 1.4 oz)   LMP  (LMP Unknown)   SpO2 98%   BMI 49.89 kg/m²   Physical Exam  Vitals reviewed.   Constitutional:       General: She is not in acute distress.     Appearance: She is obese. She is not ill-appearing.      Comments: Ambulating with walker   HENT:      Head: Normocephalic and atraumatic.   Cardiovascular:      Rate and Rhythm: Normal rate and regular rhythm.      Heart sounds: Normal heart sounds.   Pulmonary:      Effort: Pulmonary effort is normal.      Breath sounds: Normal breath sounds.   Neurological:      Mental Status: She is alert.      Motor: Weakness present.   Psychiatric:         Attention and Perception: Attention normal.         Mood and Affect: Mood and affect normal. Mood is not depressed.         Behavior: Behavior normal. Behavior is cooperative.         Thought Content: " Thought content normal.         Revised Cardiac Risk Index (RCRI) for Pre-Operative Risk   (estimates risk of cardiac complications after noncardiac surgery)    High-risk surgery: No 0  Intraperitoneal, intrathoracic, suprainguinal vascular  History of ischemic heart disease:  Yes +1  Hx of MI, (+) exercise test, current chest pain considered due to myocardial ischemia, use of nitrate therapy or ECG with pathological Q waves)  History of CHF: No 0   Pulmonary edema, B/L rales or S3 gallop; DÍAZ, orthopnea, PND, CXR showing pulmonary vascular redistribution)  History of cerebrovascular disease: Yes +1  Prior TIA or stroke  Pre-operative treatment with insulin: No 0   Pre-operative creatinine >2 mg/dL: No 0     RCRI Scorin points: Class III Risk, 10.1% 30-day risk of death, MI, or cardiac arrest    Lab Results   Component Value Date    CREATININE 0.74 2025       Ginna was seen today for pre-op exam.    Diagnoses and all orders for this visit:    Endometrial mass    Primary hypertension    Acquired hypothyroidism      Recent labs including TSH, T4, CBC, CMP, lipid panel completed 3/19/25 and reviewed - no concerning findings   EKG completed 3/19/25 - sinus loli - otherwise normal - okay to proceed with surgery    Recommendations:  Ginna Landeros is undergoing an elective Moderate Risk surgery, Hysteroscopy D&C. She is RCRI class III risk (2 points for CVA and MI history) with 10.1% 30-day risk of death, MI, or cardiac arrest. She may proceed with surgery as planned. No pre-op form provided to me.

## 2025-04-01 ENCOUNTER — OFFICE VISIT (OUTPATIENT)
Dept: FAMILY MEDICINE CLINIC | Facility: CLINIC | Age: 65
End: 2025-04-01
Payer: COMMERCIAL

## 2025-04-01 VITALS
SYSTOLIC BLOOD PRESSURE: 147 MMHG | DIASTOLIC BLOOD PRESSURE: 60 MMHG | OXYGEN SATURATION: 98 % | HEIGHT: 66 IN | BODY MASS INDEX: 47.09 KG/M2 | HEART RATE: 67 BPM | WEIGHT: 293 LBS | TEMPERATURE: 97.8 F

## 2025-04-01 DIAGNOSIS — N94.89 ENDOMETRIAL MASS: Primary | ICD-10-CM

## 2025-04-01 DIAGNOSIS — I10 PRIMARY HYPERTENSION: ICD-10-CM

## 2025-04-01 DIAGNOSIS — E03.9 ACQUIRED HYPOTHYROIDISM: ICD-10-CM

## 2025-04-01 PROCEDURE — 99214 OFFICE O/P EST MOD 30 MIN: CPT | Performed by: FAMILY MEDICINE

## 2025-04-17 ENCOUNTER — NURSE TRIAGE (OUTPATIENT)
Age: 65
End: 2025-04-17

## 2025-04-17 NOTE — TELEPHONE ENCOUNTER
"Patient has had swelling in BLE, states this is new for her. Is having difficulty walking. No OV available today. OV scheduled for Monday. Patient does not wish to go to the ED today but will if it gets worse. Please f/u with patient.       Reason for Disposition   Swelling of face, arm or hands  (Exception: Slight puffiness of fingers during hot weather.)    Answer Assessment - Initial Assessment Questions  1. ONSET: \"When did the swelling start?\" (e.g., minutes, hours, days)      5 days ago ankle started swelling  2. LOCATION: \"What part of the leg is swollen?\"  \"Are both legs swollen or just one leg?\"      Both ankles  3. SEVERITY: \"How bad is the swelling?\" (e.g., localized; mild, moderate, severe)      severe  4. REDNESS: \"Does the swelling look red or infected?\"      Left leg redness  5. PAIN: \"Is the swelling painful to touch?\" If Yes, ask: \"How painful is it?\"   (Scale 1-10; mild, moderate or severe)      moderate  6. FEVER: \"Do you have a fever?\" If Yes, ask: \"What is it, how was it measured, and when did it start?\"       unknown  7. CAUSE: \"What do you think is causing the leg swelling?\"      no  8. MEDICAL HISTORY: \"Do you have a history of blood clots (e.g., DVT), cancer, heart failure, kidney disease, or liver failure?\"      yes  9. RECURRENT SYMPTOM: \"Have you had leg swelling before?\" If Yes, ask: \"When was the last time?\" \"What happened that time?\"      Yes, many years ago  10. OTHER SYMPTOMS: \"Do you have any other symptoms?\" (e.g., chest pain, difficulty breathing)        No    no    Protocols used: Leg Swelling and Edema-Adult-[place]    "

## 2025-04-17 NOTE — TELEPHONE ENCOUNTER
Regarding: Painful and swollen legs  ----- Message from Madeline ALCANTAR sent at 4/17/2025 12:06 PM EDT -----  Painful and swollen legs for 5 days, I made her an appointment on Monday 4/21/2025 at 2:20 pm. She stated her legs feel warm.

## 2025-04-21 ENCOUNTER — OFFICE VISIT (OUTPATIENT)
Dept: FAMILY MEDICINE CLINIC | Facility: CLINIC | Age: 65
End: 2025-04-21
Payer: COMMERCIAL

## 2025-04-21 VITALS
HEIGHT: 66 IN | DIASTOLIC BLOOD PRESSURE: 98 MMHG | HEART RATE: 64 BPM | OXYGEN SATURATION: 98 % | WEIGHT: 293 LBS | SYSTOLIC BLOOD PRESSURE: 160 MMHG | BODY MASS INDEX: 47.09 KG/M2

## 2025-04-21 DIAGNOSIS — R60.0 BILATERAL LOWER EXTREMITY EDEMA: Primary | ICD-10-CM

## 2025-04-21 DIAGNOSIS — L40.4 GUTTATE PSORIASIS: ICD-10-CM

## 2025-04-21 PROCEDURE — 99214 OFFICE O/P EST MOD 30 MIN: CPT | Performed by: FAMILY MEDICINE

## 2025-04-21 RX ORDER — FUROSEMIDE 40 MG/1
40 TABLET ORAL DAILY
Qty: 14 TABLET | Refills: 0 | Status: SHIPPED | OUTPATIENT
Start: 2025-04-21 | End: 2025-05-05

## 2025-04-21 RX ORDER — TRIAMCINOLONE ACETONIDE 5 MG/G
CREAM TOPICAL 2 TIMES DAILY
Qty: 454 G | Refills: 0 | Status: SHIPPED | OUTPATIENT
Start: 2025-04-21

## 2025-04-21 NOTE — PROGRESS NOTES
Name: Ginna Landeros      : 1960      MRN: [id number]  Encounter Provider: Mallika Avila DO  Encounter Date: 2025   Encounter department: [facility]  :  Assessment & Plan  Bilateral lower extremity edema  Bilateral pain and swelling  Suspect multifactorial - concern of edema worsening in setting of growing endometrial mass - has upcoming srugery scheduled with gyn  No sign of infection or concern for DVT at this time given bilateral nature and pitting edema  Recommend lasix daily for 2 weeks  Recommend compression and lymphedema therapy   Repeat BMP in 2 weeks, follow up as scheduled in 3 weeks     Orders:    furosemide (LASIX) 40 mg tablet; Take 1 tablet (40 mg total) by mouth daily for 14 days    Basic metabolic panel; Future    Ambulatory Referral to PT/OT Lymphedema Therapy; Future    Guttate psoriasis  Severe - recommend derm eval and steroid cream     Orders:    Ambulatory Referral to Dermatology; Future    triamcinolone (KENALOG) 0.5 % cream; Apply topically 2 (two) times a day        Urinary Incontinence Plan of Care: counseling topics discussed: practice Kegel (pelvic floor strengthening) exercises, use restroom every 2 hours and limit alcohol, caffeine, spicy foods, and acidic foods.     Tobacco Cessation Counseling: Tobacco cessation counseling was provided. The patient is sincerely urged to quit consumption of tobacco. She is ready to quit tobacco. Medication options discussed.         Patient has follow up already scheduled with me in May  Return in about 3 months (around 2025) for AWV.    History of Present Illness   Chief Complaint   Patient presents with    Leg Pain     Onset 5 days, both legs pain 9/10, no known injury,          Leg Pain   The incident occurred more than 1 week ago (started 5 days ago - woke up with the pain in both legs). The incident occurred at home. There was no injury mechanism. The pain is present in the left leg and  "right leg. The quality of the pain is described as aching and cramping. The pain is moderate. The pain has been Constant since onset. Associated symptoms include muscle weakness and tingling. Pertinent negatives include no inability to bear weight, loss of motion, loss of sensation or numbness. She reports no foreign bodies present. The symptoms are aggravated by movement, weight bearing and palpation. She has tried NSAIDs, immobilization, non-weight bearing, rest and elevation for the symptoms. The treatment provided no relief.   Review of Systems   Neurological:  Positive for tingling. Negative for numbness.     Smoking 1 cig per day on average     Objective   /98 (BP Location: Right arm, Patient Position: Sitting, Cuff Size: Large)   Pulse 64   Ht 5' 6\" (1.676 m)   Wt (!) 142 kg (313 lb 7.9 oz)   LMP  (LMP Unknown)   SpO2 98%   BMI 50.60 kg/m²      Physical Exam  Musculoskeletal:         General: Tenderness (bilateral LE, worse at both ankles) present.      Right lower leg: Edema present.      Left lower leg: Edema present.   Skin:     General: Skin is warm.      Findings: Rash present. No erythema.         "

## 2025-05-23 ENCOUNTER — TELEPHONE (OUTPATIENT)
Age: 65
End: 2025-05-23

## 2025-05-23 NOTE — TELEPHONE ENCOUNTER
fredi from [facility] [phone], requesting an update on Physician's order for incontinence supplies mailed to the office,  Fredi given office fax number to fax request in stead.

## 2025-05-30 ENCOUNTER — TELEPHONE (OUTPATIENT)
Age: 65
End: 2025-05-30

## 2025-05-30 NOTE — TELEPHONE ENCOUNTER
Received call from [facility] requesting clarification on provider last name as Baljit was crossed off the form   I advised she was recently  and [place] is her last name.     Order under media 5/30

## 2025-06-02 ENCOUNTER — TELEPHONE (OUTPATIENT)
Age: 65
End: 2025-06-02

## 2025-06-02 NOTE — TELEPHONE ENCOUNTER
Patient called states she was Seen Emergency Room Saturday, blood pressure fluctuating and monitored reading 208/86. Advised to call  Primary Care Provider ,patient states she is scheduled for surgery 2025. Emergency Room Advised to request an increase in blood pressure medication. Patient states she was warned she could've . Patient believes she may also have an UTI. Please advise patient if any further questions.

## 2025-06-06 DIAGNOSIS — F01.B3 MODERATE VASCULAR DEMENTIA WITH MOOD DISTURBANCE (HCC): ICD-10-CM

## 2025-06-06 DIAGNOSIS — E78.2 MIXED HYPERLIPIDEMIA: ICD-10-CM

## 2025-06-06 DIAGNOSIS — I25.10 CORONARY ARTERY DISEASE INVOLVING NATIVE CORONARY ARTERY OF NATIVE HEART WITHOUT ANGINA PECTORIS: ICD-10-CM

## 2025-06-06 DIAGNOSIS — I69.30 HISTORY OF CVA WITH RESIDUAL DEFICIT: ICD-10-CM

## 2025-06-06 RX ORDER — ATORVASTATIN CALCIUM 80 MG/1
80 TABLET, FILM COATED ORAL DAILY
Qty: 90 TABLET | Refills: 1 | Status: SHIPPED | OUTPATIENT
Start: 2025-06-06

## 2025-07-10 ENCOUNTER — OFFICE VISIT (OUTPATIENT)
Dept: FAMILY MEDICINE CLINIC | Facility: CLINIC | Age: 65
End: 2025-07-10
Payer: COMMERCIAL

## 2025-07-10 VITALS
BODY MASS INDEX: 47.09 KG/M2 | OXYGEN SATURATION: 98 % | DIASTOLIC BLOOD PRESSURE: 70 MMHG | HEART RATE: 81 BPM | WEIGHT: 293 LBS | HEIGHT: 66 IN | SYSTOLIC BLOOD PRESSURE: 143 MMHG

## 2025-07-10 DIAGNOSIS — Z12.31 ENCOUNTER FOR SCREENING MAMMOGRAM FOR BREAST CANCER: ICD-10-CM

## 2025-07-10 DIAGNOSIS — E03.9 ACQUIRED HYPOTHYROIDISM: ICD-10-CM

## 2025-07-10 DIAGNOSIS — E78.2 MIXED HYPERLIPIDEMIA: ICD-10-CM

## 2025-07-10 DIAGNOSIS — I10 PRIMARY HYPERTENSION: ICD-10-CM

## 2025-07-10 DIAGNOSIS — I25.10 CORONARY ARTERY DISEASE INVOLVING NATIVE CORONARY ARTERY OF NATIVE HEART WITHOUT ANGINA PECTORIS: ICD-10-CM

## 2025-07-10 DIAGNOSIS — Z78.0 POSTMENOPAUSAL: ICD-10-CM

## 2025-07-10 DIAGNOSIS — Z12.11 SCREENING FOR COLON CANCER: ICD-10-CM

## 2025-07-10 DIAGNOSIS — F01.B3 MODERATE VASCULAR DEMENTIA WITH MOOD DISTURBANCE (HCC): ICD-10-CM

## 2025-07-10 DIAGNOSIS — R06.2 WHEEZING: ICD-10-CM

## 2025-07-10 DIAGNOSIS — F41.1 GAD (GENERALIZED ANXIETY DISORDER): ICD-10-CM

## 2025-07-10 DIAGNOSIS — J30.9 CHRONIC ALLERGIC RHINITIS: ICD-10-CM

## 2025-07-10 DIAGNOSIS — E61.1 IRON DEFICIENCY: ICD-10-CM

## 2025-07-10 DIAGNOSIS — R26.2 AMBULATORY DYSFUNCTION: ICD-10-CM

## 2025-07-10 DIAGNOSIS — I69.30 HISTORY OF CVA WITH RESIDUAL DEFICIT: ICD-10-CM

## 2025-07-10 DIAGNOSIS — Z00.00 MEDICARE ANNUAL WELLNESS VISIT, SUBSEQUENT: Primary | ICD-10-CM

## 2025-07-10 DIAGNOSIS — K21.9 GASTROESOPHAGEAL REFLUX DISEASE WITHOUT ESOPHAGITIS: ICD-10-CM

## 2025-07-10 DIAGNOSIS — Z23 ENCOUNTER FOR IMMUNIZATION: ICD-10-CM

## 2025-07-10 PROCEDURE — 99214 OFFICE O/P EST MOD 30 MIN: CPT | Performed by: FAMILY MEDICINE

## 2025-07-10 PROCEDURE — G0439 PPPS, SUBSEQ VISIT: HCPCS | Performed by: FAMILY MEDICINE

## 2025-07-10 PROCEDURE — 90677 PCV20 VACCINE IM: CPT | Performed by: FAMILY MEDICINE

## 2025-07-10 PROCEDURE — G0009 ADMIN PNEUMOCOCCAL VACCINE: HCPCS | Performed by: FAMILY MEDICINE

## 2025-07-10 RX ORDER — ASPIRIN 81 MG/1
81 TABLET, CHEWABLE ORAL DAILY
Qty: 90 TABLET | Refills: 0 | Status: SHIPPED | OUTPATIENT
Start: 2025-07-10

## 2025-07-10 RX ORDER — DONEPEZIL HYDROCHLORIDE 10 MG/1
10 TABLET, FILM COATED ORAL
Qty: 90 TABLET | Refills: 0 | Status: SHIPPED | OUTPATIENT
Start: 2025-07-10

## 2025-07-10 RX ORDER — EZETIMIBE 10 MG/1
10 TABLET ORAL DAILY
Qty: 90 TABLET | Refills: 0 | Status: SHIPPED | OUTPATIENT
Start: 2025-07-10

## 2025-07-10 RX ORDER — LEVOTHYROXINE SODIUM 150 UG/1
150 TABLET ORAL
Qty: 30 TABLET | Refills: 3 | Status: SHIPPED | OUTPATIENT
Start: 2025-07-10

## 2025-07-10 RX ORDER — ALBUTEROL SULFATE 90 UG/1
2 INHALANT RESPIRATORY (INHALATION) EVERY 6 HOURS PRN
Qty: 6.7 G | Refills: 5 | Status: SHIPPED | OUTPATIENT
Start: 2025-07-10

## 2025-07-10 RX ORDER — CETIRIZINE HYDROCHLORIDE 10 MG/1
10 TABLET ORAL DAILY
Qty: 90 TABLET | Refills: 1 | Status: SHIPPED | OUTPATIENT
Start: 2025-07-10

## 2025-07-10 RX ORDER — BUSPIRONE HYDROCHLORIDE 10 MG/1
10 TABLET ORAL 2 TIMES DAILY
Qty: 180 TABLET | Refills: 0 | Status: SHIPPED | OUTPATIENT
Start: 2025-07-10

## 2025-07-10 RX ORDER — METOPROLOL SUCCINATE 50 MG/1
50 TABLET, EXTENDED RELEASE ORAL DAILY
Qty: 90 TABLET | Refills: 1 | Status: SHIPPED | OUTPATIENT
Start: 2025-07-10 | End: 2025-07-18 | Stop reason: SDUPTHER

## 2025-07-10 RX ORDER — FERROUS SULFATE 325(65) MG
325 TABLET ORAL
Qty: 90 TABLET | Refills: 1 | Status: SHIPPED | OUTPATIENT
Start: 2025-07-10

## 2025-07-10 RX ORDER — FLUOXETINE HYDROCHLORIDE 40 MG/1
40 CAPSULE ORAL DAILY
Qty: 90 CAPSULE | Refills: 0 | Status: SHIPPED | OUTPATIENT
Start: 2025-07-10

## 2025-07-10 RX ORDER — OMEPRAZOLE 40 MG/1
40 CAPSULE, DELAYED RELEASE ORAL DAILY
Qty: 90 CAPSULE | Refills: 1 | Status: SHIPPED | OUTPATIENT
Start: 2025-07-10

## 2025-07-10 NOTE — PATIENT INSTRUCTIONS
Call [phone] to schedule mammo and bone density  Medicare Preventive Visit Patient Instructions  Thank you for completing your Welcome to Medicare Visit or Medicare Annual Wellness Visit today. Your next wellness visit will be due in one year (7/11/2026).  The screening/preventive services that you may require over the next 5-10 years are detailed below. Some tests may not apply to you based off risk factors and/or age. Screening tests ordered at today's visit but not completed yet may show as past due. Also, please note that scanned in results may not display below.  Preventive Screenings:  Service Recommendations Previous Testing/Comments   Colorectal Cancer Screening  * Colonoscopy    * Fecal Occult Blood Test (FOBT)/Fecal Immunochemical Test (FIT)  * Fecal DNA/Cologuard Test  * Flexible Sigmoidoscopy Age: 45-75 years old   Colonoscopy: every 10 years (may be performed more frequently if at higher risk)  OR  FOBT/FIT: every 1 year  OR  Cologuard: every 3 years  OR  Sigmoidoscopy: every 5 years  Screening may be recommended earlier than age 45 if at higher risk for colorectal cancer. Also, an individualized decision between you and your healthcare provider will decide whether screening between the ages of 76-85 would be appropriate. Colonoscopy: Not on file  FOBT/FIT: Not on file  Cologuard: Not on file  Sigmoidoscopy: Not on file          Breast Cancer Screening Age: 40+ years old  Frequency: every 1-2 years  Not required if history of left and right mastectomy Mammogram: Not on file        Cervical Cancer Screening Between the ages of 21-29, pap smear recommended once every 3 years.   Between the ages of 30-65, can perform pap smear with HPV co-testing every 5 years.   Recommendations may differ for women with a history of total hysterectomy, cervical cancer, or abnormal pap smears in past. Pap Smear: Not on file    Screening Not Indicated   Hepatitis C Screening Once for adults born between 1945 and  1965  More frequently in patients at high risk for Hepatitis C Hep C Antibody: 10/16/2020    Screening Current   Diabetes Screening 1-2 times per year if you're at risk for diabetes or have pre-diabetes Fasting glucose: 106 mg/dL (2/7/2025)  A1C: 5.6 % (6/13/2021)  Screening Current   Cholesterol Screening Once every 5 years if you don't have a lipid disorder. May order more often based on risk factors. Lipid panel: 03/19/2025    Screening Not Indicated  History Lipid Disorder     Other Preventive Screenings Covered by Medicare:  Abdominal Aortic Aneurysm (AAA) Screening: covered once if your at risk. You're considered to be at risk if you have a family history of AAA.  Lung Cancer Screening: covers low dose CT scan once per year if you meet all of the following conditions: (1) Age 55-77; (2) No signs or symptoms of lung cancer; (3) Current smoker or have quit smoking within the last 15 years; (4) You have a tobacco smoking history of at least 20 pack years (packs per day multiplied by number of years you smoked); (5) You get a written order from a healthcare provider.  Glaucoma Screening: covered annually if you're considered high risk: (1) You have diabetes OR (2) Family history of glaucoma OR (3)  aged 50 and older OR (4)  American aged 65 and older  Osteoporosis Screening: covered every 2 years if you meet one of the following conditions: (1) You're estrogen deficient and at risk for osteoporosis based off medical history and other findings; (2) Have a vertebral abnormality; (3) On glucocorticoid therapy for more than 3 months; (4) Have primary hyperparathyroidism; (5) On osteoporosis medications and need to assess response to drug therapy.   Last bone density test (DXA Scan): Not on file.  HIV Screening: covered annually if you're between the age of 15-65. Also covered annually if you are younger than 15 and older than 65 with risk factors for HIV infection. For pregnant patients, it is  covered up to 3 times per pregnancy.    Immunizations:  Immunization Recommendations   Influenza Vaccine Annual influenza vaccination during flu season is recommended for all persons aged >= 6 months who do not have contraindications   Pneumococcal Vaccine   * Pneumococcal conjugate vaccine = PCV13 (Prevnar 13), PCV15 (Vaxneuvance), PCV20 (Prevnar 20)  * Pneumococcal polysaccharide vaccine = PPSV23 (Pneumovax) Adults 19-65 yo with certain risk factors or if 65+ yo  If never received any pneumonia vaccine: recommend Prevnar 20 (PCV20)  Give PCV20 if previously received 1 dose of PCV13 or PPSV23   Hepatitis B Vaccine 3 dose series if at intermediate or high risk (ex: diabetes, end stage renal disease, liver disease)   Respiratory syncytial virus (RSV) Vaccine - COVERED BY MEDICARE PART D  * RSVPreF3 (Arexvy) CDC recommends that adults 60 years of age and older may receive a single dose of RSV vaccine using shared clinical decision-making (SCDM)   Tetanus (Td) Vaccine - COST NOT COVERED BY MEDICARE PART B Following completion of primary series, a booster dose should be given every 10 years to maintain immunity against tetanus. Td may also be given as tetanus wound prophylaxis.   Tdap Vaccine - COST NOT COVERED BY MEDICARE PART B Recommended at least once for all adults. For pregnant patients, recommended with each pregnancy.   Shingles Vaccine (Shingrix) - COST NOT COVERED BY MEDICARE PART B  2 shot series recommended in those 19 years and older who have or will have weakened immune systems or those 50 years and older     Health Maintenance Due:      Topic Date Due    Breast Cancer Screening: Mammogram  Never done    Colorectal Cancer Screening  Never done    Hepatitis C Screening  Completed     Immunizations Due:      Topic Date Due    Pneumococcal Vaccine: 50+ Years (2 of 2 - PCV) 05/15/2021    COVID-19 Vaccine (1 - 2024-25 season) Never done    Influenza Vaccine (1) 09/01/2025     Advance Directives   What are  advance directives?  Advance directives are legal documents that state your wishes and plans for medical care. These plans are made ahead of time in case you lose your ability to make decisions for yourself. Advance directives can apply to any medical decision, such as the treatments you want, and if you want to donate organs.   What are the types of advance directives?  There are many types of advance directives, and each state has rules about how to use them. You may choose a combination of any of the following:  Living will:  This is a written record of the treatment you want. You can also choose which treatments you do not want, which to limit, and which to stop at a certain time. This includes surgery, medicine, IV fluid, and tube feedings.   Durable power of  for healthcare (DPAHC):  This is a written record that states who you want to make healthcare choices for you when you are unable to make them for yourself. This person, called a proxy, is usually a family member or a friend. You may choose more than 1 proxy.  Do not resuscitate (DNR) order:  A DNR order is used in case your heart stops beating or you stop breathing. It is a request not to have certain forms of treatment, such as CPR. A DNR order may be included in other types of advance directives.  Medical directive:  This covers the care that you want if you are in a coma, near death, or unable to make decisions for yourself. You can list the treatments you want for each condition. Treatment may include pain medicine, surgery, blood transfusions, dialysis, IV or tube feedings, and a ventilator (breathing machine).  Values history:  This document has questions about your views, beliefs, and how you feel and think about life. This information can help others choose the care that you would choose.  Why are advance directives important?  An advance directive helps you control your care. Although spoken wishes may be used, it is better to have your  wishes written down. Spoken wishes can be misunderstood, or not followed. Treatments may be given even if you do not want them. An advance directive may make it easier for your family to make difficult choices about your care.   Fall Prevention    Fall prevention  includes ways to make your home and other areas safer. It also includes ways you can move more carefully to prevent a fall. Health conditions that cause changes in your blood pressure, vision, or muscle strength and coordination may increase your risk for falls. Medicines may also increase your risk for falls if they make you dizzy, weak, or sleepy.   Fall prevention tips:   Stand or sit up slowly.    Use assistive devices as directed.    Wear shoes that fit well and have soles that .    Wear a personal alarm.    Stay active.    Manage your medical conditions.    Home Safety Tips:  Add items to prevent falls in the bathroom.    Keep paths clear.    Install bright lights in your home.    Keep items you use often on shelves within reach.    Paint or place reflective tape on the edges of your stairs.    Cigarette Smoking and Your Health   Risks to your health if you smoke:  Nicotine and other chemicals found in tobacco damage every cell in your body. Even if you are a light smoker, you have an increased risk for cancer, heart disease, and lung disease. If you are pregnant or have diabetes, smoking increases your risk for complications.   Benefits to your health if you stop smoking:   You decrease respiratory symptoms such as coughing, wheezing, and shortness of breath.   You reduce your risk for cancers of the lung, mouth, throat, kidney, bladder, pancreas, stomach, and cervix. If you already have cancer, you increase the benefits of chemotherapy. You also reduce your risk for cancer returning or a second cancer from developing.   You reduce your risk for heart disease, blood clots, heart attack, and stroke.   You reduce your risk for lung infections, and  diseases such as pneumonia, asthma, chronic bronchitis, and emphysema.  Your circulation improves. More oxygen can be delivered to your body. If you have diabetes, you lower your risk for complications, such as kidney, artery, and eye diseases. You also lower your risk for nerve damage. Nerve damage can lead to amputations, poor vision, and blindness.  You improve your body's ability to heal and to fight infections.  For more information and support to stop smoking:   [employer]  Phone: 8- 802 - [phone]  Web Address: www.[employer].com  Weight Management   Why it is important to manage your weight:  Being overweight increases your risk of health conditions such as heart disease, high blood pressure, type 2 diabetes, and certain types of cancer. It can also increase your risk for osteoarthritis, sleep apnea, and other respiratory problems. Aim for a slow, steady weight loss. Even a small amount of weight loss can lower your risk of health problems.  How to lose weight safely:  A safe and healthy way to lose weight is to eat fewer calories and get regular exercise. You can lose up about 1 pound a week by decreasing the number of calories you eat by 500 calories each day.   Healthy meal plan for weight management:  A healthy meal plan includes a variety of foods, contains fewer calories, and helps you stay healthy. A healthy meal plan includes the following:  Eat whole-grain foods more often.  A healthy meal plan should contain fiber. Fiber is the part of grains, fruits, and vegetables that is not broken down by your body. Whole-grain foods are healthy and provide extra fiber in your diet. Some examples of whole-grain foods are whole-wheat breads and pastas, oatmeal, brown rice, and bulgur.  Eat a variety of vegetables every day.  Include dark, leafy greens such as spinach, kale, miguel greens, and mustard greens. Eat yellow and orange vegetables such as carrots, sweet potatoes, and winter squash.   Eat a variety  of fruits every day.  Choose fresh or canned fruit (canned in its own juice or light syrup) instead of juice. Fruit juice has very little or no fiber.  Eat low-fat dairy foods.  Drink fat-free (skim) milk or 1% milk. Eat fat-free yogurt and low-fat cottage cheese. Try low-fat cheeses such as mozzarella and other reduced-fat cheeses.  Choose meat and other protein foods that are low in fat.  Choose beans or other legumes such as split peas or lentils. Choose fish, skinless poultry (chicken or turkey), or lean cuts of red meat (beef or pork). Before you cook meat or poultry, cut off any visible fat.   Use less fat and oil.  Try baking foods instead of frying them. Add less fat, such as margarine, sour cream, regular salad dressing and mayonnaise to foods. Eat fewer high-fat foods. Some examples of high-fat foods include french fries, doughnuts, ice cream, and cakes.  Eat fewer sweets.  Limit foods and drinks that are high in sugar. This includes candy, cookies, regular soda, and sweetened drinks.  Exercise:  Exercise at least 30 minutes per day on most days of the week. Some examples of exercise include walking, biking, dancing, and swimming. You can also fit in more physical activity by taking the stairs instead of the elevator or parking farther away from stores. Ask your healthcare provider about the best exercise plan for you.    © Copyright [employer] 2018 Information is for End User's use only and may not be sold, redistributed or otherwise used for commercial purposes. All illustrations and images included in CareNotes® are the copyrighted property of [employer] or [employer]

## 2025-07-10 NOTE — ASSESSMENT & PLAN NOTE
Orders:    donepezil (Aricept) 10 mg tablet; Take 1 tablet (10 mg total) by mouth daily at bedtime    Ambulatory Referral to [facility] Care; Future

## 2025-07-10 NOTE — PROGRESS NOTES
Name: Ginna Landeros      : 1960      MRN: [id number]  Encounter Provider: Mallika Avila DO  Encounter Date: 7/10/2025   Encounter department: [facility]  :  Assessment & Plan  Medicare annual wellness visit, subsequent         Encounter for screening mammogram for breast cancer    Orders:    Mammo screening bilateral w 3d and cad; Future    Postmenopausal    Orders:    DXA bone density spine hip and pelvis; Future    Screening for colon cancer    Orders:    Ambulatory Referral to Gastroenterology; Future    Coronary artery disease involving native coronary artery of native heart without angina pectoris    Orders:    aspirin 81 mg chewable tablet; Chew 1 tablet (81 mg total) daily    metoprolol succinate (TOPROL-XL) 50 mg 24 hr tablet; Take 1 tablet (50 mg total) by mouth daily    History of CVA with residual deficit    Orders:    aspirin 81 mg chewable tablet; Chew 1 tablet (81 mg total) daily    SEUN (generalized anxiety disorder)    Orders:    busPIRone (BUSPAR) 10 mg tablet; Take 1 tablet (10 mg total) by mouth 2 (two) times a day    FLUoxetine (PROzac) 40 MG capsule; Take 1 capsule (40 mg total) by mouth daily    Chronic allergic rhinitis    Orders:    cetirizine (ZyrTEC) 10 mg tablet; Take 1 tablet (10 mg total) by mouth daily    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)    Orders:    donepezil (Aricept) 10 mg tablet; Take 1 tablet (10 mg total) by mouth daily at bedtime    Ambulatory Referral to Senior Care; Future    Mixed hyperlipidemia    Orders:    ezetimibe (ZETIA) 10 mg tablet; Take 1 tablet (10 mg total) by mouth daily    Acquired hypothyroidism    Orders:    levothyroxine 150 mcg tablet; Take 1 tablet (150 mcg total) by mouth daily in the early morning    Primary hypertension    Orders:    metoprolol succinate (TOPROL-XL) 50 mg 24 hr tablet; Take 1 tablet (50 mg total) by mouth daily    Gastroesophageal reflux disease without  esophagitis    Orders:    omeprazole (PriLOSEC) 40 MG capsule; Take 1 capsule (40 mg total) by mouth daily    Iron deficiency    Orders:    ferrous sulfate 325 (65 Fe) mg tablet; Take 1 tablet (325 mg total) by mouth daily with breakfast    Wheezing    Orders:    albuterol (Proventil HFA) 90 mcg/act inhaler; Inhale 2 puffs every 6 (six) hours as needed for wheezing    Encounter for immunization    Orders:    Pneumococcal Conjugate Vaccine 20-valent (Pcv20)    Ambulatory dysfunction    Orders:    Ambulatory Referral to Physical Therapy; Future      Depression Screening and Follow-up Plan: Patient's depression screening was positive with a PHQ-9 score of 12.   Patient with underlying depression and was advised to continue current medications as prescribed. Restart Prozac       Return in about 4 weeks (around 8/7/2025) for Recheck Chronic Health (need OVL 30m) .    Patient has been off all meds except Losartan. We refilled all meds and she will come back in 4 weeks to reassess all her chronic health conditions. She will be referred to Geriatrics as I am concerned about further cog decline and trouble with medications.       Preventive health issues were discussed with patient, and age appropriate screening tests were ordered as noted in patient's After Visit Summary. Personalized health advice and appropriate referrals for health education or preventive services given if needed, as noted in patient's After Visit Summary.    History of Present Illness     HPI   Patient Care Team:  Mallika Avila DO as PCP - General (Family Medicine)  Mallika Avila DO as PCP - [facility] ([facility])    Review of Systems   Constitutional:  Negative for appetite change, chills, diaphoresis, fever and unexpected weight change.   Eyes:  Negative for visual disturbance.   Respiratory:  Negative for shortness of breath.    Cardiovascular:  Positive for leg swelling. Negative for chest pain.   Gastrointestinal:  Negative for  constipation and diarrhea.   Endocrine: Negative for polydipsia and polyuria.   Genitourinary:  Negative for frequency.   Neurological:  Negative for dizziness, light-headedness and headaches.   Psychiatric/Behavioral:  Positive for confusion.      Medical History Reviewed by provider this encounter:  Tobacco  Allergies  Meds  Problems  Med Hx  Surg Hx  Fam Hx       Annual Wellness Visit Questionnaire   Ginna is here for her Subsequent Wellness visit.     Health Risk Assessment:   Patient rates overall health as good. Patient feels that their physical health rating is same. Patient is satisfied with their life. Eyesight was rated as slightly worse. Hearing was rated as same. Patient feels that their emotional and mental health rating is slightly better. Patients states they are often angry. Patient states they are sometimes unusually tired/fatigued. Pain experienced in the last 7 days has been none. Patient states that she has experienced no weight loss or gain in last 6 months.     Depression Screening:   PHQ-9 Score: 12      Fall Risk Screening:   In the past year, patient has experienced: history of falling in past year    Number of falls: 1  Injured during fall?: No    Feels unsteady when standing or walking?: Yes    Worried about falling?: Yes      Urinary Incontinence Screening:   Patient has not leaked urine accidently in the last six months.     Home Safety:  Patient has trouble with stairs inside or outside of their home. Patient has working smoke alarms and has no working carbon monoxide detector. Home safety hazards include: none.     Nutrition:   Current diet is Regular.     Medications:   Patient is currently taking over-the-counter supplements. OTC medications include: see medication list. Patient is able to manage medications.     Activities of Daily Living (ADLs)/Instrumental Activities of Daily Living (IADLs):   Walk and transfer into and out of bed and chair?: Yes  Dress and groom  yourself?: Yes    Bathe or shower yourself?: No    Feed yourself? Yes  Do your laundry/housekeeping?: No  Manage your money, pay your bills and track your expenses?: Yes  Make your own meals?: Yes    Do your own shopping?: Yes    ADL comments: Patient has a full time caregiver that helps with laundry and bathing.     Previous Hospitalizations:   Any hospitalizations or ED visits within the last 12 months?: Yes    How many hospitalizations have you had in the last year?: 1-2    Advance Care Planning:   Living will: No    Durable POA for healthcare: No    Advanced directive: No      Cognitive Screening:   Provider or family/friend/caregiver concerned regarding cognition?: Yes    Preventive Screenings      Cardiovascular Screening:    General: Screening Not Indicated and History Lipid Disorder      Diabetes Screening:     General: Screening Current      Colorectal Cancer Screening:     General: Risks and Benefits Discussed    Due for: Colonoscopy - Low Risk      Breast Cancer Screening:     General: Risks and Benefits Discussed    Due for: Mammogram        Cervical Cancer Screening:    General: Screening Not Indicated      Osteoporosis Screening:    General: Screening Current    Due for: DXA Axial      Lung Cancer Screening:     General: Screening Not Indicated      Hepatitis C Screening:    General: Screening Current    Immunizations:  - Immunizations due: Prevnar 20  - Risks/benefits immunizations discussed    - Immunizations given per orders      Screening, Brief Intervention, and Referral to Treatment (SBIRT)     Screening  Typical number of drinks in a day: 0  Typical number of drinks in a week: 0  Interpretation: Low risk drinking behavior.    Single Item Drug Screening:  How often have you used an illegal drug (including marijuana) or a prescription medication for non-medical reasons in the past year? never    Single Item Drug Screen Score: 0  Interpretation: Negative screen for possible drug use  "disorder    SDOH Risk Assessment  Social determinants of health (SDOH) risk assesment tool was completed. The tool at a minimum covered housing stability, food insecurity, transportation needs, and utility difficulty. Patient had at risk responses for the following SDOH domains: transportation needs.     Social Drivers of Health     Financial Resource Strain: Low Risk  (11/26/2024)    Received [facility]    Overall Financial Resource Strain (CARDIA)     Difficulty of Paying Living Expenses: Not very hard   Food Insecurity: No Food Insecurity (7/10/2025)    Nursing - Inadequate Food Risk Classification     Worried About Running Out of Food in the Last Year: Never true     Ran Out of Food in the Last Year: Never true   Transportation Needs: No Transportation Needs (7/10/2025)    PRAPARE - Transportation     Lack of Transportation (Medical): No     Lack of Transportation (Non-Medical): No   Housing Stability: Low Risk  (7/10/2025)    Housing Stability Vital Sign     Unable to Pay for Housing in the Last Year: No     Number of Times Moved in the Last Year: 0     Homeless in the Last Year: No   Utilities: Not At Risk (7/10/2025)    [facility] Utilities     Threatened with loss of utilities: No     No results found.    Objective   /70 (BP Location: Right arm, Patient Position: Sitting, Cuff Size: Standard)   Pulse 81   Ht 5' 6\" (1.676 m)   Wt (!) 153 kg (336 lb 13.8 oz)   LMP  (LMP Unknown)   SpO2 98%   BMI 54.37 kg/m²     Physical Exam  Vitals reviewed.   Constitutional:       General: She is not in acute distress.     Appearance: She is obese. She is not ill-appearing.      Comments: Ambulates with walker   HENT:      Head: Normocephalic and atraumatic.      Nose: Nose normal.     Eyes:      Extraocular Movements: Extraocular movements intact.      Conjunctiva/sclera: Conjunctivae normal.     Neck:      Thyroid: No thyroid mass or thyromegaly.     Cardiovascular:      Rate and Rhythm: Normal " rate and regular rhythm.      Heart sounds: Normal heart sounds.   Pulmonary:      Effort: Pulmonary effort is normal.      Breath sounds: Normal breath sounds. No wheezing, rhonchi or rales.   Abdominal:      General: Abdomen is flat. There is no distension.      Palpations: Abdomen is soft.      Tenderness: There is no abdominal tenderness. There is no guarding or rebound.     Musculoskeletal:      Cervical back: Neck supple. No tenderness.      Right lower leg: Edema present.      Left lower leg: Edema present.   Lymphadenopathy:      Cervical: No cervical adenopathy.     Neurological:      Mental Status: She is alert.     Psychiatric:         Mood and Affect: Mood normal.         Behavior: Behavior normal.         Thought Content: Thought content normal.

## 2025-07-10 NOTE — ASSESSMENT & PLAN NOTE
Orders:    busPIRone (BUSPAR) 10 mg tablet; Take 1 tablet (10 mg total) by mouth 2 (two) times a day    FLUoxetine (PROzac) 40 MG capsule; Take 1 capsule (40 mg total) by mouth daily

## 2025-07-10 NOTE — ASSESSMENT & PLAN NOTE
Orders:    aspirin 81 mg chewable tablet; Chew 1 tablet (81 mg total) daily    metoprolol succinate (TOPROL-XL) 50 mg 24 hr tablet; Take 1 tablet (50 mg total) by mouth daily

## 2025-07-15 ENCOUNTER — TELEPHONE (OUTPATIENT)
Age: 65
End: 2025-07-15

## 2025-07-17 ENCOUNTER — NURSE TRIAGE (OUTPATIENT)
Dept: OTHER | Facility: OTHER | Age: 65
End: 2025-07-17

## 2025-07-18 ENCOUNTER — APPOINTMENT (OUTPATIENT)
Dept: LAB | Facility: CLINIC | Age: 65
End: 2025-07-18
Payer: COMMERCIAL

## 2025-07-18 ENCOUNTER — OFFICE VISIT (OUTPATIENT)
Dept: FAMILY MEDICINE CLINIC | Facility: CLINIC | Age: 65
End: 2025-07-18
Payer: COMMERCIAL

## 2025-07-18 ENCOUNTER — OFFICE VISIT (OUTPATIENT)
Dept: LAB | Facility: CLINIC | Age: 65
End: 2025-07-18
Attending: FAMILY MEDICINE
Payer: COMMERCIAL

## 2025-07-18 VITALS
OXYGEN SATURATION: 97 % | SYSTOLIC BLOOD PRESSURE: 162 MMHG | HEART RATE: 50 BPM | BODY MASS INDEX: 47.09 KG/M2 | WEIGHT: 293 LBS | HEIGHT: 66 IN | DIASTOLIC BLOOD PRESSURE: 92 MMHG

## 2025-07-18 DIAGNOSIS — I25.10 CORONARY ARTERY DISEASE INVOLVING NATIVE CORONARY ARTERY OF NATIVE HEART WITHOUT ANGINA PECTORIS: ICD-10-CM

## 2025-07-18 DIAGNOSIS — R42 DIZZINESS: Primary | ICD-10-CM

## 2025-07-18 DIAGNOSIS — R42 DIZZINESS: ICD-10-CM

## 2025-07-18 DIAGNOSIS — I10 PRIMARY HYPERTENSION: ICD-10-CM

## 2025-07-18 DIAGNOSIS — E03.9 ACQUIRED HYPOTHYROIDISM: ICD-10-CM

## 2025-07-18 LAB
ANION GAP SERPL CALCULATED.3IONS-SCNC: 7 MMOL/L (ref 4–13)
ATRIAL RATE: 47 BPM
BUN SERPL-MCNC: 21 MG/DL (ref 5–25)
CALCIUM SERPL-MCNC: 9.9 MG/DL (ref 8.4–10.2)
CHLORIDE SERPL-SCNC: 107 MMOL/L (ref 96–108)
CO2 SERPL-SCNC: 28 MMOL/L (ref 21–32)
CREAT SERPL-MCNC: 0.94 MG/DL (ref 0.6–1.3)
ERYTHROCYTE [DISTWIDTH] IN BLOOD BY AUTOMATED COUNT: 14.6 % (ref 11.6–15.1)
GFR SERPL CREATININE-BSD FRML MDRD: 62 ML/MIN/1.73SQ M
GLUCOSE SERPL-MCNC: 112 MG/DL (ref 65–140)
HCT VFR BLD AUTO: 41.4 % (ref 34.8–46.1)
HGB BLD-MCNC: 12.4 G/DL (ref 11.5–15.4)
MCH RBC QN AUTO: 27 PG (ref 26.8–34.3)
MCHC RBC AUTO-ENTMCNC: 30 G/DL (ref 31.4–37.4)
MCV RBC AUTO: 90 FL (ref 82–98)
P AXIS: 51 DEGREES
PLATELET # BLD AUTO: 251 THOUSANDS/UL (ref 149–390)
PMV BLD AUTO: 10.8 FL (ref 8.9–12.7)
POTASSIUM SERPL-SCNC: 4.2 MMOL/L (ref 3.5–5.3)
PR INTERVAL: 214 MS
QRS AXIS: 65 DEGREES
QRSD INTERVAL: 88 MS
QT INTERVAL: 436 MS
QTC INTERVAL: 386 MS
RBC # BLD AUTO: 4.59 MILLION/UL (ref 3.81–5.12)
SODIUM SERPL-SCNC: 142 MMOL/L (ref 135–147)
T WAVE AXIS: 25 DEGREES
T4 FREE SERPL-MCNC: 0.57 NG/DL (ref 0.61–1.12)
TSH SERPL DL<=0.05 MIU/L-ACNC: 43.86 UIU/ML (ref 0.45–4.5)
VENTRICULAR RATE: 47 BPM
WBC # BLD AUTO: 5.2 THOUSAND/UL (ref 4.31–10.16)

## 2025-07-18 PROCEDURE — 84443 ASSAY THYROID STIM HORMONE: CPT | Performed by: FAMILY MEDICINE

## 2025-07-18 PROCEDURE — 99214 OFFICE O/P EST MOD 30 MIN: CPT | Performed by: FAMILY MEDICINE

## 2025-07-18 PROCEDURE — 80048 BASIC METABOLIC PNL TOTAL CA: CPT | Performed by: FAMILY MEDICINE

## 2025-07-18 PROCEDURE — 93010 ELECTROCARDIOGRAM REPORT: CPT | Performed by: INTERNAL MEDICINE

## 2025-07-18 PROCEDURE — 36415 COLL VENOUS BLD VENIPUNCTURE: CPT | Performed by: FAMILY MEDICINE

## 2025-07-18 PROCEDURE — 84439 ASSAY OF FREE THYROXINE: CPT | Performed by: FAMILY MEDICINE

## 2025-07-18 PROCEDURE — 85027 COMPLETE CBC AUTOMATED: CPT | Performed by: FAMILY MEDICINE

## 2025-07-18 PROCEDURE — 93005 ELECTROCARDIOGRAM TRACING: CPT

## 2025-07-18 RX ORDER — METOPROLOL SUCCINATE 25 MG/1
25 TABLET, EXTENDED RELEASE ORAL DAILY
Qty: 90 TABLET | Refills: 0 | Status: SHIPPED | OUTPATIENT
Start: 2025-07-18

## 2025-07-18 NOTE — TELEPHONE ENCOUNTER
Attempted to call and check on patient. Patient did not answer the phone. Left a voicemail for the patient to call the office as soon as possible.

## 2025-07-18 NOTE — TELEPHONE ENCOUNTER
"REASON FOR CONVERSATION: Hypotension    SYMPTOMS: low blood pressure, diarrhea, nausea, tired, feels like the room is spinning upon standing.     OTHER HEALTH INFORMATION: none    PROTOCOL DISPOSITION: Call  Now    CARE ADVICE PROVIDED: Patient declined to call 911 or go to the ED. Patient would like the office to follow up. Advised patient to press life alert or call 911 if symptoms worsen.     PRACTICE FOLLOW-UP: yes, please follow up with patient      Reason for Disposition   Sounds like a life-threatening emergency to the triager    Answer Assessment - Initial Assessment Questions  1. BLOOD PRESSURE: \"What is your blood pressure?\" \"Did you take at least two measurements 5 minutes apart?\"      90/40 heart rate 57 and 123/53 heart rate 53    2. ONSET: \"When did you take your blood pressure?\"      30-60 seconds     3. HOW: \"How did you take your blood pressure?\" (e.g., visiting nurse, automatic home BP monitor)      Automatic home BP monitor    4. HISTORY: \"Do you have a history of low blood pressure?\" \"What is your blood pressure normally?\"      Denies    5. MEDICINES: \"Are you taking any medicines for blood pressure?\" If Yes, ask: \"Have they been changed recently?\"      Yes    6. PULSE RATE: \"Do you know what your pulse rate is?\"       57    7. OTHER SYMPTOMS: \"Have you been sick recently?\" \"Have you had a recent injury?\"      Diarrhea times 2,  nausea, tired, when standing room feels like its spinning    Protocols used: Blood Pressure - Low-Adult-AH    "

## 2025-07-18 NOTE — TELEPHONE ENCOUNTER
Regarding: Blood pressure 94/40 & heart rate 57  ----- Message from Marely LEWIS sent at 7/17/2025  9:34 PM EDT -----  '' My blood pressure 94/40 and heart rate 57.''

## 2025-07-18 NOTE — TELEPHONE ENCOUNTER
Attempted to call the patients emergency contact Yolanda. Left a voicemail message for her to call the office in regards to the patient.

## 2025-07-18 NOTE — PROGRESS NOTES
"Name: Ginna Landeros      : 1960      MRN: [id number]  Encounter Provider: Mallika Avila DO  Encounter Date: 2025   Encounter department: [facility]  :  Assessment & Plan  Dizziness  Possibly hypotension as patient reports home BP was low, however BP is high here and she also reports high levels at home intermittently. Unclear of the accuracy of her home cuff, will bring it next visit for calibration. In the meantime, other possible causes include bradycardia as patient HR is low today. Recommend lowering metoprolol to 25mg daily from 50mg. Check CBC for anemia given history of this. Check BMP for renal fx and electrolyte causes. Check TSH as patient was off med for a while and only recently restarted, hypo or hyper could be contributing. Lab will also do EKG to check for arrhythmia in setting of bradycardia     Patient has appt in 2 weeks. Will recheck at that time.     Orders:    Basic metabolic panel; Future    CBC and Platelet; Future    TSH, 3rd generation with Free T4 reflex; Future    ECG 12 lead; Future    Acquired hypothyroidism    Orders:    TSH, 3rd generation with Free T4 reflex; Future    Coronary artery disease involving native coronary artery of native heart without angina pectoris    Orders:    metoprolol succinate (TOPROL-XL) 25 mg 24 hr tablet; Take 1 tablet (25 mg total) by mouth daily    Primary hypertension    Orders:    metoprolol succinate (TOPROL-XL) 25 mg 24 hr tablet; Take 1 tablet (25 mg total) by mouth daily      Return for Next scheduled follow up.       History of Present Illness   Chief Complaint   Patient presents with    Follow-up     Patient in office for a follow up of Hypotension.      Patient having trouble with Low blood pressure at home. Reports it was 89/60 last night. She felt \"drunk\" when this occurred. She check her blood pressure daily at home. She only recently started all her medications again. She still also gets " "high blood pressures at home even as high as 200 SBP. I am concern ed about the accuracy of her home bp cuff. She just overall feels tired and weak. She has had some decreased appetite. There is no room spinning but more of a lightheadedness, fatigue and feeling like she will fall.     Dizziness  Associated symptoms include fatigue, headaches and weakness.   Review of Systems   Constitutional:  Positive for fatigue.   Neurological:  Positive for dizziness, weakness, light-headedness and headaches.       Objective   /92 (BP Location: Right arm, Patient Position: Sitting, Cuff Size: Large)   Pulse (!) 50   Ht 5' 6\" (1.676 m)   Wt (!) 153 kg (337 lb 4.9 oz)   LMP  (LMP Unknown)   SpO2 97%   BMI 54.44 kg/m²      Physical Exam  Vitals reviewed.   Constitutional:       General: She is not in acute distress.     Appearance: She is obese. She is not ill-appearing.      Comments: Ambulates with walker   HENT:      Head: Normocephalic and atraumatic.      Nose: Nose normal.     Eyes:      Extraocular Movements: Extraocular movements intact.      Conjunctiva/sclera: Conjunctivae normal.     Neck:      Thyroid: No thyroid mass or thyromegaly.     Cardiovascular:      Rate and Rhythm: Regular rhythm. Bradycardia present.      Heart sounds: Normal heart sounds.   Pulmonary:      Effort: Pulmonary effort is normal.      Breath sounds: Normal breath sounds. No wheezing, rhonchi or rales.   Abdominal:      General: Abdomen is flat. There is no distension.      Palpations: Abdomen is soft.      Tenderness: There is no abdominal tenderness. There is no guarding or rebound.     Musculoskeletal:      Cervical back: Neck supple. No tenderness.      Right lower leg: Edema present.      Left lower leg: Edema present.   Lymphadenopathy:      Cervical: No cervical adenopathy.     Neurological:      Mental Status: She is alert.     Psychiatric:         Mood and Affect: Mood normal.         Behavior: Behavior normal.         " Thought Content: Thought content normal.

## 2025-07-21 ENCOUNTER — RESULTS FOLLOW-UP (OUTPATIENT)
Dept: FAMILY MEDICINE CLINIC | Facility: CLINIC | Age: 65
End: 2025-07-21

## 2025-07-21 NOTE — TELEPHONE ENCOUNTER
----- Message from Mallika Avila DO sent at 7/21/2025  8:57 AM EDT -----  Please call patient and confirm with her or her caretaker if she is taking her thyroid medication.  Based on her labs it does not appear so but she did tell me she was taking it.  What dose is she   currently taking if she is taking it?  ----- Message -----  From: Lab, Background User  Sent: 7/18/2025   8:53 PM EDT  To: Mallika Avila DO

## 2025-07-21 NOTE — TELEPHONE ENCOUNTER
"Attempted to reach Ginna, to relay the message per Dr. Avila \"Please call patient and confirm with her or her caretaker if she is taking her thyroid medication. Based on her labs it does not appear so but she did tell me she was taking it. What dose is she currently taking if she is taking it?\" Patient didn't answer/ left a VM.  "

## 2025-08-07 ENCOUNTER — APPOINTMENT (OUTPATIENT)
Dept: LAB | Facility: CLINIC | Age: 65
End: 2025-08-07
Payer: COMMERCIAL

## 2025-08-07 ENCOUNTER — OFFICE VISIT (OUTPATIENT)
Dept: FAMILY MEDICINE CLINIC | Facility: CLINIC | Age: 65
End: 2025-08-07
Payer: COMMERCIAL

## 2025-08-07 VITALS
HEART RATE: 85 BPM | SYSTOLIC BLOOD PRESSURE: 162 MMHG | DIASTOLIC BLOOD PRESSURE: 75 MMHG | WEIGHT: 293 LBS | OXYGEN SATURATION: 98 % | BODY MASS INDEX: 54.19 KG/M2

## 2025-08-07 DIAGNOSIS — F01.B3 MODERATE VASCULAR DEMENTIA WITH MOOD DISTURBANCE (HCC): ICD-10-CM

## 2025-08-07 DIAGNOSIS — E78.2 MIXED HYPERLIPIDEMIA: ICD-10-CM

## 2025-08-07 DIAGNOSIS — J30.9 CHRONIC ALLERGIC RHINITIS: ICD-10-CM

## 2025-08-07 DIAGNOSIS — E66.813 CLASS 3 SEVERE OBESITY DUE TO EXCESS CALORIES WITH SERIOUS COMORBIDITY AND BODY MASS INDEX (BMI) OF 45.0 TO 49.9 IN ADULT: ICD-10-CM

## 2025-08-07 DIAGNOSIS — E61.1 IRON DEFICIENCY: ICD-10-CM

## 2025-08-07 DIAGNOSIS — R00.1 BRADYCARDIA: ICD-10-CM

## 2025-08-07 DIAGNOSIS — F41.1 GAD (GENERALIZED ANXIETY DISORDER): ICD-10-CM

## 2025-08-07 DIAGNOSIS — R26.2 AMBULATORY DYSFUNCTION: ICD-10-CM

## 2025-08-07 DIAGNOSIS — I10 PRIMARY HYPERTENSION: Primary | ICD-10-CM

## 2025-08-07 DIAGNOSIS — Z91.199 NON-COMPLIANCE WITH TREATMENT: ICD-10-CM

## 2025-08-07 DIAGNOSIS — Z12.11 SCREENING FOR COLON CANCER: ICD-10-CM

## 2025-08-07 DIAGNOSIS — K21.9 GASTROESOPHAGEAL REFLUX DISEASE WITHOUT ESOPHAGITIS: ICD-10-CM

## 2025-08-07 DIAGNOSIS — I25.10 CORONARY ARTERY DISEASE INVOLVING NATIVE CORONARY ARTERY OF NATIVE HEART WITHOUT ANGINA PECTORIS: ICD-10-CM

## 2025-08-07 DIAGNOSIS — E03.9 ACQUIRED HYPOTHYROIDISM: ICD-10-CM

## 2025-08-07 DIAGNOSIS — N18.31 STAGE 3A CHRONIC KIDNEY DISEASE (HCC): ICD-10-CM

## 2025-08-07 LAB
ERYTHROCYTE [DISTWIDTH] IN BLOOD BY AUTOMATED COUNT: 15.4 % (ref 11.6–15.1)
HCT VFR BLD AUTO: 40.7 % (ref 34.8–46.1)
HGB BLD-MCNC: 12.6 G/DL (ref 11.5–15.4)
MCH RBC QN AUTO: 27.7 PG (ref 26.8–34.3)
MCHC RBC AUTO-ENTMCNC: 31 G/DL (ref 31.4–37.4)
MCV RBC AUTO: 90 FL (ref 82–98)
PLATELET # BLD AUTO: 261 THOUSANDS/UL (ref 149–390)
PMV BLD AUTO: 10.5 FL (ref 8.9–12.7)
RBC # BLD AUTO: 4.55 MILLION/UL (ref 3.81–5.12)
T4 FREE SERPL-MCNC: 1.15 NG/DL (ref 0.61–1.12)
TSH SERPL DL<=0.05 MIU/L-ACNC: 6.38 UIU/ML (ref 0.45–4.5)
WBC # BLD AUTO: 6.49 THOUSAND/UL (ref 4.31–10.16)

## 2025-08-07 PROCEDURE — 85027 COMPLETE CBC AUTOMATED: CPT | Performed by: FAMILY MEDICINE

## 2025-08-07 PROCEDURE — 84443 ASSAY THYROID STIM HORMONE: CPT | Performed by: FAMILY MEDICINE

## 2025-08-07 PROCEDURE — 99214 OFFICE O/P EST MOD 30 MIN: CPT | Performed by: FAMILY MEDICINE

## 2025-08-07 PROCEDURE — 84439 ASSAY OF FREE THYROXINE: CPT | Performed by: FAMILY MEDICINE

## 2025-08-19 ENCOUNTER — HOSPITAL ENCOUNTER (OUTPATIENT)
Dept: RADIOLOGY | Facility: CLINIC | Age: 65
Discharge: HOME | End: 2025-08-19
Attending: FAMILY MEDICINE
Payer: COMMERCIAL

## 2025-08-19 VITALS — WEIGHT: 293 LBS | BODY MASS INDEX: 50.02 KG/M2 | HEIGHT: 64 IN

## 2025-08-19 VITALS — BODY MASS INDEX: 50.02 KG/M2 | WEIGHT: 293 LBS | HEIGHT: 64 IN

## 2025-08-19 DIAGNOSIS — Z12.31 ENCOUNTER FOR SCREENING MAMMOGRAM FOR BREAST CANCER: ICD-10-CM

## 2025-08-19 DIAGNOSIS — Z78.0 POSTMENOPAUSAL: ICD-10-CM

## 2025-08-19 PROCEDURE — 77063 BREAST TOMOSYNTHESIS BI: CPT

## 2025-08-19 PROCEDURE — 77067 SCR MAMMO BI INCL CAD: CPT

## 2025-08-19 PROCEDURE — 77080 DXA BONE DENSITY AXIAL: CPT

## 2025-08-22 LAB
ATRIAL RATE: 47 BPM
P AXIS: 51 DEGREES
PR INTERVAL: 214 MS
QRS AXIS: 65 DEGREES
QRSD INTERVAL: 88 MS
QT INTERVAL: 436 MS
QTC INTERVAL: 386 MS
T WAVE AXIS: 25 DEGREES
VENTRICULAR RATE: 47 BPM